# Patient Record
Sex: FEMALE | Race: WHITE | NOT HISPANIC OR LATINO
[De-identification: names, ages, dates, MRNs, and addresses within clinical notes are randomized per-mention and may not be internally consistent; named-entity substitution may affect disease eponyms.]

---

## 2017-03-28 ENCOUNTER — LABORATORY RESULT (OUTPATIENT)
Age: 52
End: 2017-03-28

## 2017-03-28 ENCOUNTER — APPOINTMENT (OUTPATIENT)
Dept: RHEUMATOLOGY | Facility: CLINIC | Age: 52
End: 2017-03-28

## 2017-03-28 VITALS
BODY MASS INDEX: 39.31 KG/M2 | SYSTOLIC BLOOD PRESSURE: 130 MMHG | WEIGHT: 195 LBS | DIASTOLIC BLOOD PRESSURE: 72 MMHG | OXYGEN SATURATION: 98 % | HEIGHT: 59 IN | HEART RATE: 77 BPM | TEMPERATURE: 97.8 F

## 2017-03-28 DIAGNOSIS — M62.838 OTHER MUSCLE SPASM: ICD-10-CM

## 2017-03-28 RX ORDER — MULTIVITAMIN
TABLET ORAL
Refills: 0 | Status: ACTIVE | COMMUNITY

## 2017-03-28 RX ORDER — NAPROXEN 500 MG/1
500 TABLET, DELAYED RELEASE ORAL
Qty: 60 | Refills: 0 | Status: DISCONTINUED | COMMUNITY
Start: 2016-11-29 | End: 2017-03-28

## 2017-03-30 LAB
APPEARANCE: CLEAR
B2 GLYCOPROT1 IGA SERPL IA-ACNC: <5 SAU
BILIRUBIN URINE: NEGATIVE
BLOOD URINE: NEGATIVE
C3 SERPL-MCNC: 153 MG/DL
C4 SERPL-MCNC: 38 MG/DL
CARDIOLIPIN AB SER IA-ACNC: POSITIVE
COLOR: YELLOW
CONFIRM: 25.3 SEC
DRVVT IMM 1:2 NP PPP: NORMAL
DRVVT SCREEN TO CONFIRM RATIO: 1.09 RATIO
ERYTHROCYTE [SEDIMENTATION RATE] IN BLOOD BY WESTERGREN METHOD: 17 MM/HR
GLUCOSE QUALITATIVE U: NORMAL MG/DL
KETONES URINE: NEGATIVE
LEUKOCYTE ESTERASE URINE: NEGATIVE
NITRITE URINE: NEGATIVE
PH URINE: 6
PROTEIN URINE: NEGATIVE MG/DL
SCREEN DRVVT: 31 SEC
SPECIFIC GRAVITY URINE: 1.02
UROBILINOGEN URINE: NORMAL MG/DL

## 2017-04-04 LAB
DSDNA AB SER-ACNC: 35 IU/ML
PS IGA SER QL: <20 U/ML
PS IGG SER QL: <10 U/ML
PS IGM SER QL: 27 U/ML

## 2017-05-01 ENCOUNTER — RESULT REVIEW (OUTPATIENT)
Age: 52
End: 2017-05-01

## 2017-08-22 ENCOUNTER — APPOINTMENT (OUTPATIENT)
Dept: RHEUMATOLOGY | Facility: CLINIC | Age: 52
End: 2017-08-22

## 2018-02-28 ENCOUNTER — OUTPATIENT (OUTPATIENT)
Dept: OUTPATIENT SERVICES | Facility: HOSPITAL | Age: 53
LOS: 1 days | End: 2018-02-28
Payer: COMMERCIAL

## 2018-02-28 ENCOUNTER — APPOINTMENT (OUTPATIENT)
Dept: ULTRASOUND IMAGING | Facility: CLINIC | Age: 53
End: 2018-02-28
Payer: COMMERCIAL

## 2018-02-28 DIAGNOSIS — Z00.8 ENCOUNTER FOR OTHER GENERAL EXAMINATION: ICD-10-CM

## 2018-02-28 PROCEDURE — 76536 US EXAM OF HEAD AND NECK: CPT | Mod: 26

## 2018-02-28 PROCEDURE — 76536 US EXAM OF HEAD AND NECK: CPT

## 2018-03-15 ENCOUNTER — APPOINTMENT (OUTPATIENT)
Dept: RHEUMATOLOGY | Facility: CLINIC | Age: 53
End: 2018-03-15
Payer: COMMERCIAL

## 2018-03-15 VITALS
HEART RATE: 109 BPM | DIASTOLIC BLOOD PRESSURE: 86 MMHG | WEIGHT: 197 LBS | OXYGEN SATURATION: 97 % | SYSTOLIC BLOOD PRESSURE: 130 MMHG | HEIGHT: 71 IN | BODY MASS INDEX: 27.58 KG/M2

## 2018-03-15 PROCEDURE — 99214 OFFICE O/P EST MOD 30 MIN: CPT

## 2018-03-15 RX ORDER — TIZANIDINE 4 MG/1
4 TABLET ORAL
Qty: 90 | Refills: 2 | Status: DISCONTINUED | COMMUNITY
Start: 2017-03-28 | End: 2018-03-15

## 2018-03-15 RX ORDER — OMEPRAZOLE 40 MG/1
40 CAPSULE, DELAYED RELEASE ORAL
Qty: 30 | Refills: 0 | Status: DISCONTINUED | COMMUNITY
Start: 2016-10-10 | End: 2018-03-15

## 2018-03-26 ENCOUNTER — LABORATORY RESULT (OUTPATIENT)
Age: 53
End: 2018-03-26

## 2018-03-27 LAB
ALBUMIN SERPL ELPH-MCNC: 4.3 G/DL
ALP BLD-CCNC: 81 U/L
ALT SERPL-CCNC: 22 U/L
ANION GAP SERPL CALC-SCNC: 17 MMOL/L
APPEARANCE: CLEAR
AST SERPL-CCNC: 21 U/L
B2 GLYCOPROT1 IGA SERPL IA-ACNC: <5 SAU
BASOPHILS # BLD AUTO: 0.01 K/UL
BASOPHILS NFR BLD AUTO: 0.2 %
BILIRUB SERPL-MCNC: 0.2 MG/DL
BILIRUBIN URINE: NEGATIVE
BLOOD URINE: NEGATIVE
BUN SERPL-MCNC: 15 MG/DL
C3 SERPL-MCNC: 151 MG/DL
C4 SERPL-MCNC: 37 MG/DL
CALCIUM SERPL-MCNC: 9.5 MG/DL
CARDIOLIPIN AB SER IA-ACNC: POSITIVE
CHLORIDE SERPL-SCNC: 101 MMOL/L
CO2 SERPL-SCNC: 24 MMOL/L
COLOR: YELLOW
CONFIRM: 25.2 SEC
CREAT SERPL-MCNC: 0.92 MG/DL
CRP SERPL-MCNC: 0.6 MG/DL
DRVVT IMM 1:2 NP PPP: NORMAL
DRVVT SCREEN TO CONFIRM RATIO: 1.21 RATIO
DSDNA AB SER-ACNC: 34 IU/ML
ENA RNP AB SER IA-ACNC: <0.2 AL
ENA SM AB SER IA-ACNC: <0.2 AL
ENA SS-A AB SER IA-ACNC: <0.2 AL
ENA SS-B AB SER IA-ACNC: <0.2 AL
EOSINOPHIL # BLD AUTO: 0.09 K/UL
EOSINOPHIL NFR BLD AUTO: 1.6 %
ERYTHROCYTE [SEDIMENTATION RATE] IN BLOOD BY WESTERGREN METHOD: 27 MM/HR
GLUCOSE QUALITATIVE U: NEGATIVE MG/DL
GLUCOSE SERPL-MCNC: 84 MG/DL
HCT VFR BLD CALC: 37.8 %
HGB BLD-MCNC: 12.7 G/DL
IMM GRANULOCYTES NFR BLD AUTO: 0.2 %
KETONES URINE: NEGATIVE
LEUKOCYTE ESTERASE URINE: NEGATIVE
LYMPHOCYTES # BLD AUTO: 1.79 K/UL
LYMPHOCYTES NFR BLD AUTO: 32.4 %
MAN DIFF?: NORMAL
MCHC RBC-ENTMCNC: 27 PG
MCHC RBC-ENTMCNC: 33.6 GM/DL
MCV RBC AUTO: 80.4 FL
MONOCYTES # BLD AUTO: 0.34 K/UL
MONOCYTES NFR BLD AUTO: 6.2 %
NEUTROPHILS # BLD AUTO: 3.28 K/UL
NEUTROPHILS NFR BLD AUTO: 59.4 %
NITRITE URINE: NEGATIVE
PH URINE: 5
PLATELET # BLD AUTO: 293 K/UL
POTASSIUM SERPL-SCNC: 4.2 MMOL/L
PROT SERPL-MCNC: 7.6 G/DL
PROTEIN URINE: NEGATIVE MG/DL
RBC # BLD: 4.7 M/UL
RBC # FLD: 14.4 %
RHEUMATOID FACT SER QL: <7 IU/ML
SCREEN DRVVT: 34.3 SEC
SODIUM SERPL-SCNC: 142 MMOL/L
SPECIFIC GRAVITY URINE: 1.03
TSH SERPL-ACNC: 1.83 UIU/ML
UROBILINOGEN URINE: NEGATIVE MG/DL
WBC # FLD AUTO: 5.52 K/UL

## 2018-03-28 LAB — ANA SER IF-ACNC: NEGATIVE

## 2018-03-30 RX ORDER — CELECOXIB 200 MG/1
200 CAPSULE ORAL DAILY
Qty: 30 | Refills: 1 | Status: DISCONTINUED | COMMUNITY
Start: 2018-03-30 | End: 2018-03-30

## 2018-05-03 ENCOUNTER — RESULT REVIEW (OUTPATIENT)
Age: 53
End: 2018-05-03

## 2018-08-21 ENCOUNTER — APPOINTMENT (OUTPATIENT)
Dept: RHEUMATOLOGY | Facility: CLINIC | Age: 53
End: 2018-08-21

## 2019-04-02 ENCOUNTER — APPOINTMENT (OUTPATIENT)
Dept: RHEUMATOLOGY | Facility: CLINIC | Age: 54
End: 2019-04-02
Payer: MEDICAID

## 2019-04-02 VITALS
SYSTOLIC BLOOD PRESSURE: 110 MMHG | HEIGHT: 59 IN | OXYGEN SATURATION: 99 % | BODY MASS INDEX: 39.31 KG/M2 | HEART RATE: 75 BPM | DIASTOLIC BLOOD PRESSURE: 78 MMHG | WEIGHT: 195 LBS

## 2019-04-02 PROCEDURE — 99213 OFFICE O/P EST LOW 20 MIN: CPT

## 2019-04-02 NOTE — HISTORY OF PRESENT ILLNESS
[FreeTextEntry1] : 20\par Works as cris.. \par - last seen 1 yr ago... since then...\par - routinely taking aleve 440 mg and occas at HS uses APAP w/ Benadryl... sleep at this point still not good\par - sleep still disrupted... oxybutin effective at keeping sleep but notes exacerbation of asthma, productive sputum... and meds ineffective.  Trouble staying asleep, probable  GARRET  was supposed to have nasal surgery to address obstructed nare, but lost insurance... hasn't had sleep\par - voltaren gel not effective... \par - GERD controlled w/ once daily low dose PPI, rare mild vomiting w/ severe stress...\par - mild intermittent paresthesias hands b/l ? unclear if full hand... c/w sleep at HS \par \par ROS:  \par - no migraines, visual changes\par - no mucositis \par - cardiopulmonary sx\par - no constipation but diarrhea intermittently feels food r/t \par - no swelling in joint/s joint mild diffuse arthralgias controlled w/ aleve as noted.. 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  Intermittent episodes of radiculopathy only on L side (steroids required in past) - pain always worse after use... end of day\par Heel pain:  b/l L worse than R, xrays suggest osteoporosis.  \par \par 4) GERD/ hiatal hernia cannot miss PPI.  Ranitidine not helpful, tums (hasn't tried carafate) GI, Dr. 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  Associated w/ stiffness most significant inflammatory back sx L > R  30-60 mins but all other joints also stiff.  Usually on consistent for past year- no overt swelling in any joints-  Using Aleve 2-4 tabs daily w/ nearly complete relief. \par Hypersensitivity to touch\par Migraines in past,  3 elective ab, 1 1st trimester, no DVT, PE, MI, CVA\par Recent Cardiac stress test for long term H2/ PPI and obesity  \par NO personal or family hx of psoriais, IBD, inflammatory eye dz, AS \par Long hx LBP- localized so severe can't walk, at times can't tolerate more than 15-30 mins.  Intermittent episodes of radiculopathy only on L side (steroids required in past) \par Heel pain:  b/l L worse than R, xrays suggest osteoporosis.  MRI pending \par w/ significant hot flashes for past year- periods irreg- LMP 6 mnths ago\par Significant issue w/ dyscognition\par Lives with GERD/ hiatal hernia cannot miss PPI.  Ranitidine not helpful, tums (hasn't tried carafate) GI, Dr. Lopez otherwise no significant GI issues-\par Bladder:  IC overactive, incontinence doing well on oxyybutin- does have severe dry mouth with this, and exacerbates asthma.\par ROS:  no significant fatigue, no fevers, lymphandenopathy, dry mouth drug related, no dry eyes; serositis, thinning in male pattern, mild intermittent cough r/t asthma.  no renal/ liver dysfunction\par + Lyme and HPV in past   \par \par Asthma:  fairly well controlled on daily H2- allegra D daily- nothing else, poor tolerance in past.  Rare need for inhalers.  \par NOTE:  doesn't tolerate steroids well- everything feels miserable with change in mental status- fogginess and altered personality, migraines in past  \par \par Bone Health \par FH:  + maternal aunts w/ RA \par \par

## 2019-04-02 NOTE — DATA REVIEWED
[FreeTextEntry1] : Labs \par 11/16 B2G IGA/ IGG nl, phosphatidyly neg, ACL neg, LAC neg, EV, TPO/ TG, dsDNA neg, nl C3/4\par vit D 25, TSH/ ESR, CRP, PTT \par 7/16 B2G IgM > 150, ACL 1gM > 15 w/ nl LAC Positive and all other serologies neg w/ nl C3/4 and neg TPO/TG \par 3/16:  Uric acid 4.8, CMP, CBC nl \par \par Imaging: \par 2/18 US Thyroid/ neck neg for cervical / supraclavicular abnormality. \par Xray SI joints 8/16 nl \par Dexa 7/22/16 excellent all + values \par

## 2019-04-02 NOTE — REVIEW OF SYSTEMS
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 no infections, well healed

## 2019-04-02 NOTE — ASSESSMENT
[FreeTextEntry1] : 54 yo perimenopausal wf w/ c/o intermittent  joint pain- "everywhere" \par \par 1) POsitive EV/ Chronic pain (FM):  1:160 H. w/ + LAC, + dsDNA low 35, nl C3/4 and high titer B2G IgM > 150 \par APS:   updated labs ordered... still doesn't meet criteria \par 2016  + LAC w/ B2G IGM > 150, ACL IGM + and PTT slightly elevated w/ neg PTS\par Dec  2016  - LAC w/ neg ACL, nl PTT and neg PTS. \par Mar  2016  - LAC, + ACL IGM 24, B2G IGM > 150, neg PTS, nl PTT\par  severe bleeding throughout, 1 miscarriage 1st trimester and 3 elective ab...\par OTC NSAIDS w/ nearly complete relief. 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 unchanged\par but NO personal or family hx of psoriais, IBD, inflammatory eye dz, AS, does have + FH RA \par NOTE:  doesn't tolerate steroids well- everything feels miserable with change in mental status- fogginess and altered personality, migraines in past\par \par 3) Long hx LBP- localized so severe can't walk, at times can't tolerate more than 15-30 mins.  Intermittent episodes of radiculopathy only on L side (steroids required in past) - pain always worse after use... end of day. Doing well today w/ use of NSAIDs... would like long acting Naproxen... \par Heel pain:  b/l L worse than R, xrays suggest osteoporosis.  \par \par 4) GERD/ hiatal hernia cannot miss PPI.  Ranitidine not helpful, tums (hasn't tried carafate) GI, Dr. Lopez otherwise no significant GI issues-\par \par 5) Perimenopausal:  Perimenopausal sx severe at times w/ Dexa  nl \par \par 6) Obesity:  BMI 38- now 39 needs to be addressed, contributes to "unwell" state and potentiates inflammatory state.\par \par Plan:\par - continue current tx, no change except consider topical NSAIds to minimize Gi SE oral Naproxen w/ hx GERd\par - repeat labs now, check with me about results at this point you do NOT meet criteria for Antiphospholipid Syndrome and you do NOT have Systemic Lupus\par - continue to monitor for evidence of SLE/ or other CTD.  No clear evidence at this time, but high risk given serologies as noted.  \par - rto 3-6  months \par \par

## 2019-04-02 NOTE — PHYSICAL EXAM
[General Appearance - Alert] : alert [General Appearance - In No Acute Distress] : in no acute distress [Respiration, Rhythm And Depth] : normal respiratory rhythm and effort [Exaggerated Use Of Accessory Muscles For Inspiration] : no accessory muscle use [Auscultation Breath Sounds / Voice Sounds] : lungs were clear to auscultation bilaterally [Heart Rate And Rhythm] : heart rate was normal and rhythm regular [Heart Sounds] : normal S1 and S2 [Heart Sounds Gallop] : no gallops [Murmurs] : no murmurs [Heart Sounds Pericardial Friction Rub] : no pericardial rub [Cervical Lymph Nodes Enlarged Posterior Bilaterally] : posterior cervical [Cervical Lymph Nodes Enlarged Anterior Bilaterally] : anterior cervical [Supraclavicular Lymph Nodes Enlarged Bilaterally] : supraclavicular [No CVA Tenderness] : no ~M costovertebral angle tenderness [No Spinal Tenderness] : no spinal tenderness [Abnormal Walk] : normal gait [Nail Clubbing] : no clubbing  or cyanosis of the fingernails [Musculoskeletal - Swelling] : no joint swelling seen [Motor Tone] : muscle strength and tone were normal [Skin Color & Pigmentation] : normal skin color and pigmentation [Skin Turgor] : normal skin turgor [] : no rash [Oriented To Time, Place, And Person] : oriented to person, place, and time [Impaired Insight] : insight and judgment were intact [Affect] : the affect was normal [Arterial Pulses Carotid] : carotid pulses were normal with no bruits [FreeTextEntry1] : no psoriasis

## 2019-04-08 ENCOUNTER — OTHER (OUTPATIENT)
Age: 54
End: 2019-04-08

## 2019-04-16 ENCOUNTER — LABORATORY RESULT (OUTPATIENT)
Age: 54
End: 2019-04-16

## 2019-04-17 ENCOUNTER — OTHER (OUTPATIENT)
Age: 54
End: 2019-04-17

## 2019-04-17 LAB
ALBUMIN SERPL ELPH-MCNC: 4.6 G/DL
ALP BLD-CCNC: 91 U/L
ALT SERPL-CCNC: 25 U/L
ANION GAP SERPL CALC-SCNC: 12 MMOL/L
APPEARANCE: CLEAR
AST SERPL-CCNC: 21 U/L
BASOPHILS # BLD AUTO: 0.02 K/UL
BASOPHILS NFR BLD AUTO: 0.4 %
BILIRUB SERPL-MCNC: 0.2 MG/DL
BILIRUBIN URINE: NEGATIVE
BLOOD URINE: NEGATIVE
BUN SERPL-MCNC: 10 MG/DL
C3 SERPL-MCNC: 160 MG/DL
C4 SERPL-MCNC: 34 MG/DL
CALCIUM SERPL-MCNC: 10.2 MG/DL
CARDIOLIPIN AB SER IA-ACNC: POSITIVE
CHLORIDE SERPL-SCNC: 101 MMOL/L
CK SERPL-CCNC: 105 U/L
CO2 SERPL-SCNC: 25 MMOL/L
COLOR: NORMAL
CONFIRM: 24.5 SEC
CREAT SERPL-MCNC: 0.64 MG/DL
CREAT SPEC-SCNC: 41 MG/DL
CREAT/PROT UR: NORMAL RATIO
CRP SERPL-MCNC: 0.55 MG/DL
DRVVT IMM 1:2 NP PPP: NORMAL
DRVVT SCREEN TO CONFIRM RATIO: 1.09 RATIO
DSDNA AB SER-ACNC: 59 IU/ML
ENA RNP AB SER IA-ACNC: <0.2 AL
ENA SM AB SER IA-ACNC: <0.2 AL
ENA SS-A AB SER IA-ACNC: <0.2 AL
ENA SS-B AB SER IA-ACNC: <0.2 AL
EOSINOPHIL # BLD AUTO: 0.09 K/UL
EOSINOPHIL NFR BLD AUTO: 1.8 %
ERYTHROCYTE [SEDIMENTATION RATE] IN BLOOD BY WESTERGREN METHOD: 29 MM/HR
GLUCOSE QUALITATIVE U: NEGATIVE
GLUCOSE SERPL-MCNC: 93 MG/DL
HCT VFR BLD CALC: 39.7 %
HGB BLD-MCNC: 12.8 G/DL
IMM GRANULOCYTES NFR BLD AUTO: 0.2 %
KETONES URINE: NEGATIVE
LEUKOCYTE ESTERASE URINE: NEGATIVE
LYMPHOCYTES # BLD AUTO: 1.82 K/UL
LYMPHOCYTES NFR BLD AUTO: 35.5 %
MAN DIFF?: NORMAL
MCHC RBC-ENTMCNC: 26.6 PG
MCHC RBC-ENTMCNC: 32.2 GM/DL
MCV RBC AUTO: 82.5 FL
MONOCYTES # BLD AUTO: 0.4 K/UL
MONOCYTES NFR BLD AUTO: 7.8 %
NEUTROPHILS # BLD AUTO: 2.79 K/UL
NEUTROPHILS NFR BLD AUTO: 54.3 %
NITRITE URINE: NEGATIVE
PH URINE: 6.5
PLATELET # BLD AUTO: 320 K/UL
POTASSIUM SERPL-SCNC: 4.4 MMOL/L
PROT SERPL-MCNC: 7.1 G/DL
PROT UR-MCNC: <4 MG/DL
PROTEIN URINE: NEGATIVE
RBC # BLD: 4.81 M/UL
RBC # FLD: 13.8 %
SCREEN DRVVT: 32.1 SEC
SILICA CLOTTING TIME INTERPRETATION: ABNORMAL
SILICA CLOTTING TIME S/C: 1.17 RATIO
SODIUM SERPL-SCNC: 138 MMOL/L
SPECIFIC GRAVITY URINE: 1.01
TSH SERPL-ACNC: 1.74 UIU/ML
UROBILINOGEN URINE: NORMAL
WBC # FLD AUTO: 5.13 K/UL

## 2019-04-17 RX ORDER — NAPROXEN SODIUM 500 MG/1
500 TABLET, FILM COATED, EXTENDED RELEASE ORAL DAILY
Qty: 180 | Refills: 1 | Status: DISCONTINUED | COMMUNITY
Start: 2019-04-02 | End: 2019-04-17

## 2019-04-18 RX ORDER — MELOXICAM 7.5 MG/1
7.5 TABLET ORAL
Qty: 60 | Refills: 1 | Status: DISCONTINUED | COMMUNITY
Start: 2019-04-17 | End: 2019-04-18

## 2019-05-05 LAB
25(OH)D3 SERPL-MCNC: 29.7 NG/ML
A PHAGOCYTOPH IGG TITR SER IF: NORMAL TITER
ANA SER IF-ACNC: NEGATIVE
B BURGDOR AB SER QL IA: NEGATIVE
B MICROTI IGG TITR SER: NORMAL TITER
B2 GLYCOPROT1 IGA SERPL IA-ACNC: <5 SAU
E CHAFFEENSIS IGG TITR SER IF: ABNORMAL TITER

## 2019-05-16 ENCOUNTER — OTHER (OUTPATIENT)
Age: 54
End: 2019-05-16

## 2019-10-10 ENCOUNTER — APPOINTMENT (OUTPATIENT)
Dept: RHEUMATOLOGY | Facility: CLINIC | Age: 54
End: 2019-10-10
Payer: MEDICAID

## 2019-10-10 VITALS
SYSTOLIC BLOOD PRESSURE: 140 MMHG | HEART RATE: 76 BPM | HEIGHT: 59 IN | BODY MASS INDEX: 41.53 KG/M2 | WEIGHT: 206 LBS | DIASTOLIC BLOOD PRESSURE: 72 MMHG | OXYGEN SATURATION: 98 %

## 2019-10-10 DIAGNOSIS — E78.5 HYPERLIPIDEMIA, UNSPECIFIED: ICD-10-CM

## 2019-10-10 DIAGNOSIS — Z86.19 PERSONAL HISTORY OF OTHER INFECTIOUS AND PARASITIC DISEASES: ICD-10-CM

## 2019-10-10 DIAGNOSIS — R73.03 PREDIABETES.: ICD-10-CM

## 2019-10-10 DIAGNOSIS — I10 ESSENTIAL (PRIMARY) HYPERTENSION: ICD-10-CM

## 2019-10-10 PROCEDURE — 99214 OFFICE O/P EST MOD 30 MIN: CPT

## 2019-10-10 RX ORDER — DOXYCYCLINE HYCLATE 100 MG/1
100 CAPSULE ORAL
Qty: 14 | Refills: 0 | Status: DISCONTINUED | COMMUNITY
Start: 2019-04-25 | End: 2019-10-10

## 2019-10-10 NOTE — ASSESSMENT
[FreeTextEntry1] : 53 yo perimenopausal obese wf w/ HLD, mild HTN, recent shingles/ and erhlichiosis (both tx), + LAC w/ intermittently low level ACL/ B2G... with hx 1 miscarriage; + EV but neg subserologies  c/o intermittent  joint pain- "everywhere" \par \par 1) POsitive EV:  1:160 H. w/ + LAC, + dsDNA low 35, nl C3/4 and high titer B2G IgM > 150 \par APS:   updated labs ordered... still doesn't meet criteria \par 2016  + LAC w/ B2G IGM > 150, ACL IGM + and PTT slightly elevated w/ neg PTS\par Dec  2016  - LAC w/ neg ACL, nl PTT and neg PTS. \par Mar  2016  - LAC, + ACL IGM 24, B2G IGM > 150, neg PTS, nl PTT... \par 2019  - LAC (DRVVT) / + silica w/ low + ACL IgM nl PTT \par  severe bleeding throughout, 1 miscarriage 1st trimester and 3 elective ab...no other cytopenias/ bleeding/ clotting dyscrasias... \par \par 2) Chronic pain, mild FM: diffuse for many ys managed well with OTC / low dose NsAids but poor, NRS.. w/ hx snoring, daytime somnolence... \par - Sleep:  sleep latency up to 2-3 hs and then only 4-6 hs disrupted 2-4 x wakes.. despite use of oxybutin.  Gabapentin short term in past + response. Hasn't tried melatonin, mg or other natural therapies. Sleep study pending. \par - Migraines in past but nothing to suggest APS otherwise....\par - CP:  nothing recently mild recent cardiac stress test for long term mild - negative,  H2/ PPI w/ + response  \par - Dyscognition:  Significant issue worse when feeling poorly.. and not sleeping \par - Bladder:  IC overactive, incontinence doing well on oxyybutin- does have severe dry mouth with this\par OTC NSAIDS w/ nearly complete relief. \par - paresthesias/ post herpetic neuralgia L sided T 2 dermatome:  migratory, mild.. 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  Intermittent episodes of radiculopathy only on L side (steroids required in past) - pain always worse after use... end of day. Doing well today... would like long acting Naproxen... but issues with HTN.. needs to minimize use \par Heel pain:  b/l L worse than R, xrays suggest osteoporosis.  \par \par 5) GERD/ hiatal hernia cannot miss PPI.  Ranitidine not helpful, tums (hasn't tried carafate) GI, Dr. Lopez otherwise no significant GI issues-\par \par 6) Perimenopausal:  Perimenopausal sx severe at times w/ Dexa  nl \par \par 7) Obesity:  BMI 38- now 39- 41 with nearly 10 lb wt gain over past several months.  Now with\par - HTN:  newly dx... advised to minimize use of NSAIds and salt.. try APAP for pain.. see below\par - HLD:  noted mild ... will need to monitor\par - T2DM:  HbA1c 6.3 ... needs careful monitoring here too\par \par Plan:\par - use tylenol instead of aleve/ advil or naproxen.. reserve this 375mg to no more than once daily..  only when the tylenol doesn't ... 2400 mg max - 2 tylenol arthritis daily... . \par \par - try to minimize sodium intake (salty food.. don't add salt to food)... \par \par - take gabapentin once nightly.. if needed you can increase to 2 tabs at night if you don't feel to groggy, if you do that should go away within a week or so.  Gabapentin:  helps to give restorative sleep once on correct dose; decreases anxiety, decreases pain (all types of pain) \par \par - still need to work on wt loss.. this will help decrease snoring, sleep apnea, joint pain and back pain.  \par \par - still need to f/u w/ pulmonary regarding sleep study \par \par - rto 3-6  months \par \par

## 2019-10-10 NOTE — DATA REVIEWED
[FreeTextEntry1] : Labs \par 5/19 + dsDNA 59 w/ neg EV, SEBASTIAN, SSA/ B, + LAC w/ ACL 1gM 19, \par 11/16 B2G IGA/ IGG nl, phosphatidyly neg, ACL neg, LAC neg, EV, TPO/ TG, dsDNA neg, nl C3/4\par vit D 25, TSH/ ESR, CRP, PTT \par 7/16 B2G IgM > 150, ACL 1gM > 15 w/ nl LAC Positive and all other serologies neg w/ nl C3/4 and neg TPO/TG \par 3/16:  Uric acid 4.8, CMP, CBC nl \par \par Imaging: \par 2/18 US Thyroid/ neck neg for cervical / supraclavicular abnormality. \par Xray SI joints 8/16 nl \par Dexa 7/22/16 excellent all + values \par

## 2019-10-10 NOTE — PHYSICAL EXAM
[General Appearance - Alert] : alert [General Appearance - In No Acute Distress] : in no acute distress [Exaggerated Use Of Accessory Muscles For Inspiration] : no accessory muscle use [Respiration, Rhythm And Depth] : normal respiratory rhythm and effort [Auscultation Breath Sounds / Voice Sounds] : lungs were clear to auscultation bilaterally [Heart Sounds] : normal S1 and S2 [Heart Rate And Rhythm] : heart rate was normal and rhythm regular [Heart Sounds Gallop] : no gallops [Cervical Lymph Nodes Enlarged Posterior Bilaterally] : posterior cervical [Cervical Lymph Nodes Enlarged Anterior Bilaterally] : anterior cervical [Supraclavicular Lymph Nodes Enlarged Bilaterally] : supraclavicular [No Spinal Tenderness] : no spinal tenderness [No CVA Tenderness] : no ~M costovertebral angle tenderness [Abnormal Walk] : normal gait [Nail Clubbing] : no clubbing  or cyanosis of the fingernails [Musculoskeletal - Swelling] : no joint swelling seen [Motor Tone] : muscle strength and tone were normal [Skin Color & Pigmentation] : normal skin color and pigmentation [Skin Turgor] : normal skin turgor [] : no rash [Oriented To Time, Place, And Person] : oriented to person, place, and time [Impaired Insight] : insight and judgment were intact [Affect] : the affect was normal [General Appearance - Well-Appearing] : healthy appearing [General Appearance - Well Developed] : well developed [Murmurs] : no murmurs [Heart Sounds Pericardial Friction Rub] : no pericardial rub [Edema] : there was no peripheral edema [No Focal Deficits] : no focal deficits [FreeTextEntry1] : fast, forced speech

## 2019-10-10 NOTE — REVIEW OF SYSTEMS
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 no infections, well healed  [de-identified] : T 2 L sided post herpetic neuralgia

## 2019-10-10 NOTE — HISTORY OF PRESENT ILLNESS
[FreeTextEntry1] : 10/10/19\par - completed doxycyline for erhlichiosis tolerated but developed shingles nearly immediately after \par - overall feeling poorly, fatigue with NRS.. \par - working w/ Dr. Torres.. still feels NOT sleeping.. \par goes to bed 2-3 am then up between 7-9 am.. currently 2-4 cups > 30-40 oz daily\par Home sleep study done... pending results\par - denies recent migraines, cp/ sob/ pickering, hx of TIA/ CVA/ MI/ or DVT- PE.. no bruising or cytopenias \par Labs 10/19 nl CBC, CMP and mildly elevated Lipids/ HbA1c 6.3 \par - DM remains borderline at 6.3 HbA1c\par - Newly dx HTN and mild progressive wt gain- 6-10 lbs in past year..  but still taking 500 mg Naproxen.  Didn't tolerate Toviaz... and concerned about diuretic. Doesn't feel possibly hold NSAID 2/2 pain.. \par - sleep: too energized to fall asleep.. at times into 2-3 am.. then gets 5-6 hs but wakes several times.  Also c/o hot flashes... \par Trial APAP PM, flexeril, tizanidine both made it worse.  Only thing effective in past.. gabapentin + in past w/ excellent response.. \par - shingles severe  .. has since started Shingrix \par - Works as [employer].. makes own hours\par - GERD controlled w/ once daily low dose PPI, rare mild vomiting w/ severe stress...\par - mild intermittent paresthesias hands and at times into L leg.. along with post herpetic neuralgia noted along T2 dermatome.. gabapentin 300 mg highly effective at controlling pain and actually slept for 1st time in yr.. \par  \par \par 1) POsitive EV/ Chronic pain (FM):  1:160 H. 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  Intermittent episodes of radiculopathy only on L side (steroids required in past) - pain always worse after use... end of day\par Heel pain:  b/l L worse than R, xrays suggest osteoporosis.  \par \par 4) GERD/ hiatal hernia cannot miss PPI.  Ranitidine not helpful, tums (hasn't tried carafate) GI, Dr. 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  Associated w/ stiffness most significant inflammatory back sx L > R  30-60 mins but all other joints also stiff.  Usually on consistent for past year- no overt swelling in any joints-  Using Aleve 2-4 tabs daily w/ nearly complete relief. \par Hypersensitivity to touch\par Migraines in past,  3 elective ab, 1 1st trimester, no DVT, PE, MI, CVA\par Recent Cardiac stress test for long term H2/ PPI and obesity 2016 \par NO personal or family hx of psoriais, IBD, inflammatory eye dz, AS \par Long hx LBP- localized so severe can't walk, at times can't tolerate more than 15-30 mins.  Intermittent episodes of radiculopathy only on L side (steroids required in past) \par Heel pain:  b/l L worse than R, xrays suggest osteoporosis.  MRI pending \par w/ significant hot flashes for past year- periods irreg- LMP 6 mnths ago\par Significant issue w/ dyscognition\par Lives with GERD/ hiatal hernia cannot miss PPI.  Ranitidine not helpful, tums (hasn't tried carafate) GI, Dr. Lopez otherwise no significant GI issues-\par Bladder:  IC overactive, incontinence doing well on oxyybutin- does have severe dry mouth with this, and exacerbates asthma.\par ROS:  no significant fatigue, no fevers, lymphandenopathy, dry mouth drug related, no dry eyes; serositis, thinning in male pattern, mild intermittent cough r/t asthma.  no renal/ liver dysfunction\par + Lyme and HPV in past   \par \par Asthma:  fairly well controlled on daily H2- allegra D daily- nothing else, poor tolerance in past.  Rare need for inhalers.  \par NOTE:  doesn't tolerate steroids well- everything feels miserable with change in mental status- fogginess and altered personality, migraines in past  \par \par Bone Health \par FH:  + maternal aunts w/ RA \par \par

## 2019-11-01 ENCOUNTER — OUTPATIENT (OUTPATIENT)
Dept: OUTPATIENT SERVICES | Facility: HOSPITAL | Age: 54
LOS: 1 days | End: 2019-11-01
Payer: MEDICAID

## 2019-11-06 ENCOUNTER — APPOINTMENT (OUTPATIENT)
Dept: RADIOLOGY | Facility: CLINIC | Age: 54
End: 2019-11-06
Payer: MEDICAID

## 2019-11-06 ENCOUNTER — OUTPATIENT (OUTPATIENT)
Dept: OUTPATIENT SERVICES | Facility: HOSPITAL | Age: 54
LOS: 1 days | End: 2019-11-06
Payer: MEDICAID

## 2019-11-06 DIAGNOSIS — Z71.89 OTHER SPECIFIED COUNSELING: ICD-10-CM

## 2019-11-06 DIAGNOSIS — Z00.8 ENCOUNTER FOR OTHER GENERAL EXAMINATION: ICD-10-CM

## 2019-11-06 PROCEDURE — 73562 X-RAY EXAM OF KNEE 3: CPT

## 2019-11-06 PROCEDURE — 73562 X-RAY EXAM OF KNEE 3: CPT | Mod: 26,LT

## 2019-11-12 ENCOUNTER — APPOINTMENT (OUTPATIENT)
Dept: ORTHOPEDIC SURGERY | Facility: CLINIC | Age: 54
End: 2019-11-12
Payer: MEDICAID

## 2019-11-12 VITALS
HEART RATE: 97 BPM | BODY MASS INDEX: 41.53 KG/M2 | SYSTOLIC BLOOD PRESSURE: 137 MMHG | DIASTOLIC BLOOD PRESSURE: 91 MMHG | HEIGHT: 59 IN | WEIGHT: 206 LBS | TEMPERATURE: 98.2 F

## 2019-11-12 DIAGNOSIS — Z78.9 OTHER SPECIFIED HEALTH STATUS: ICD-10-CM

## 2019-11-12 DIAGNOSIS — Z87.39 PERSONAL HISTORY OF OTHER DISEASES OF THE MUSCULOSKELETAL SYSTEM AND CONNECTIVE TISSUE: ICD-10-CM

## 2019-11-12 DIAGNOSIS — Z86.39 PERSONAL HISTORY OF OTHER ENDOCRINE, NUTRITIONAL AND METABOLIC DISEASE: ICD-10-CM

## 2019-11-12 DIAGNOSIS — Z87.09 PERSONAL HISTORY OF OTHER DISEASES OF THE RESPIRATORY SYSTEM: ICD-10-CM

## 2019-11-12 PROCEDURE — 99203 OFFICE O/P NEW LOW 30 MIN: CPT | Mod: 25

## 2019-11-12 PROCEDURE — 20610 DRAIN/INJ JOINT/BURSA W/O US: CPT | Mod: LT

## 2019-11-13 NOTE — REVIEW OF SYSTEMS
[Negative] : Heme/Lymph [FreeTextEntry9] : Joint pain; joint stiffness; joint swelling  [FreeTextEntry2] : Recent weight gain [FreeTextEntry5] : Lower extremity edema  [FreeTextEntry8] : Urinary frequency; urinary urgency; incontinence  [de-identified] : Headache  [de-identified] : Sleep disturbances [de-identified] : Muscle weakness; hot flashes

## 2019-11-13 NOTE — HISTORY OF PRESENT ILLNESS
[de-identified] : The patient comes in today with complaints of left knee pain, mostly on the medial side.  The patient states that it has been going on for a while.  It should be noted that the patient is morbidly obese and is complaining of all joint pains.  She states she does have a history of lupus.  The patient states the onset/injury occurred on 9/5/19.  This injury is not work related or due to an automobile accident.  The patient states the pain is localized.  The patient describes the pain as achy and then sharp when trying to straighten it.  The patient states standing makes the pain better while bending and trying to stand makes the pain worse.\par \par Pain level includes a current pain level of 7/10.\par \par Ailment Interference:  \par Daily Living:  10/10\par Normal Work:  10/10\par Sleep:  10/10\par Enjoyment of Life:  10/10\par Climbing Stairs:  10/10\par Sports:  0/10\par Extra-Curricular Activities:   10/10 [] : Yes [de-identified] : She notes her left leg is weak.   [de-identified] : Dr. Osei

## 2019-11-13 NOTE — DISCUSSION/SUMMARY
[de-identified] : The patient was given a cortisone injection for the left knee pain, as noted above.  She is advised to ice it.  She is advised to return back to the office in a period of two weeks to see how she is feeling to discuss further treatment.

## 2019-11-13 NOTE — PROCEDURE
[de-identified] : Consent: \par At this time, I have recommended an injection to the left knee.  The risks and benefits of the procedure were discussed with the patient in detail.  Upon verbal consent of the patient, we proceeded with the injection as noted below.  \par \par Procedure:  \par After a sterile prep, the patient underwent an injection of 9 cc of 1% Lidocaine without epinephrine and 1 cc of Kenalog into the left knee.  The patient tolerated the procedure well.  There were no complications.

## 2019-11-13 NOTE — ADDENDUM
[FreeTextEntry1] : This note was written by Sana Vazquez on 11/13/2019 acting as scribe for Daly Sage, FIONA/L, PA

## 2019-11-13 NOTE — PHYSICAL EXAM
[de-identified] : Right Knee: \par Knee: Range of Motion in Degrees	\par 	                  Claimant/Normal:\par 		\par Flexion Active            135                        135-degrees\par Flexion Passive	  135	                135-degrees	\par Extension Active	  0-5	                0-5-degrees	\par Extension Passive	  0-5	                0-5-degrees	\par \par No weakness to flexion/extension.  No evidence of instability in the AP plane or varus or valgus stress.  Negative  Lachman.  Negative pivot shift.  Negative anterior drawer test.  Negative posterior drawer test.  Negative Dagoberto.  Negative Apley grind.  No medial or lateral joint line tenderness.  No tenderness over the medial and lateral facet of the patella.  No patellofemoral crepitations.  No lateral tilting patella.  No patellar apprehension.  No crepitation in the medial and lateral femoral condyle.  No proximal or distal swelling, edema or tenderness.  No gross motor or sensory deficits.  No intra-articular swelling.  2+ DP and PT pulses. No varus or valgus malalignment.  Skin is intact.  No rashes, scars or lesions.  \par  \par Left Knee:  \par Knee: Range of Motion in Degrees	\par 	                  Claimant:	Normal:	\par Flexion Active	  135 	                135-degrees	\par Flexion Passive	  135	                135-degrees	\par Extension Active	  0-5	                0-5-degrees	\par Extension Passive	  0-5	                0-5-degrees	\par \par No weakness to flexion/extension.  No evidence of instability in the AP plane or varus or valgus stress.  Negative  Lachman.  Negative pivot shift.  Negative anterior drawer test.  Negative posterior drawer test.  Positive medial joint line tenderness.  Negative lateral joint line tenderness.  Positive Dagoberto.  Positive Apley grind.  No tenderness over the medial and lateral facet of the patella.  No patellofemoral crepitations.  No lateral tilting patella.  No patella apprehension.  No crepitation in the medial and lateral femoral condyle.  No proximal or distal swelling, edema or tenderness.  No gross motor or sensory deficits.  Mild intra-articular swelling.  2+ DP and PT pulses.  No varus or valgus malalignment.  Skin is intact.  No rashes, scars or lesions.   \par  [de-identified] : General Appearance:  Well-developed, well-nourished female in no acute distress. \par  [de-identified] : Ambulating with a slightly antalgic to antalgic gait.  Station:  Normal.  [de-identified] : Outside x-rays reveals no acute fractures or dislocations.

## 2019-11-14 DIAGNOSIS — Z76.89 PERSONS ENCOUNTERING HEALTH SERVICES IN OTHER SPECIFIED CIRCUMSTANCES: ICD-10-CM

## 2019-11-19 ENCOUNTER — APPOINTMENT (OUTPATIENT)
Dept: ORTHOPEDIC SURGERY | Facility: CLINIC | Age: 54
End: 2019-11-19
Payer: MEDICAID

## 2019-11-19 VITALS
HEIGHT: 59 IN | WEIGHT: 206 LBS | BODY MASS INDEX: 41.53 KG/M2 | TEMPERATURE: 98.1 F | HEART RATE: 76 BPM | DIASTOLIC BLOOD PRESSURE: 86 MMHG | SYSTOLIC BLOOD PRESSURE: 145 MMHG

## 2019-11-19 PROCEDURE — 99213 OFFICE O/P EST LOW 20 MIN: CPT

## 2019-11-22 NOTE — HISTORY OF PRESENT ILLNESS
[de-identified] : The patient comes in today for her left knee.  She states she was here a few weeks ago with left knee pain, for which we gave her an injection.  The patient states it took a lot of her pain away, but she is still having pain and difficulty doing her activities of daily living.  It should be noted that the patient is morbid obese and complaining about all her joints. She does have a history of lupus.  The patient states this injury happened on 09/05/19.  The patient describes it as achy and then sharp when trying to straighten it.\par

## 2019-11-22 NOTE — ADDENDUM
[FreeTextEntry1] : This note was written by Reese Figureoa on 11/22/2019, acting as a scribe for MARTIN ZAVALETA, FIONA/DAVID PA

## 2019-11-22 NOTE — DISCUSSION/SUMMARY
[de-identified] : At this time, due to left knee pain, the patient is going to get an MRI to rule out any pathology since she states the pain is excruciating daily.\par

## 2019-11-22 NOTE — PHYSICAL EXAM
[de-identified] : Left Knee: Range of Motion in Degrees	\par 	                  Claimant:	Normal:	\par Flexion Active	  135 	                135-degrees	\par Flexion Passive	  135	                135-degrees	\par Extension Active	  0-5	                0-5-degrees	\par Extension Passive	  0-5	                0-5-degrees	\par \par No weakness to flexion/extension.  No evidence of instability in the AP plane or varus or valgus stress.  Negative  Lachman.  Negative pivot shift.  Negative anterior drawer test.  Negative posterior drawer test.  Pain over the medial joint line.  Positive medial joint line tenderness.  Negative lateral joint line tenderness.  Positive Dagoberto.  Positive Apley grind.  No tenderness over the medial and lateral facet of the patella.  No patellofemoral crepitations.  No lateral tilting patella.  No patella apprehension.  No crepitation in the medial and lateral femoral condyle.  No proximal or distal swelling, edema or tenderness.  No gross motor or sensory deficits.  Mild intra-articular swelling.  2+ DP and PT pulses.  No varus or valgus malalignment.  Skin is intact.  No rashes, scars or lesions.   \par   [de-identified] : Gait and Station:  Ambulating with a slightly antalgic to antalgic gait.  Normal Station.  [de-identified] : Appearance:  Well developed, well-nourished female in no acute distress.\par   [de-identified] : Review of outside x-rays of the left knee reveal no acute fractures or dislocations.\par

## 2019-12-02 ENCOUNTER — FORM ENCOUNTER (OUTPATIENT)
Age: 54
End: 2019-12-02

## 2019-12-03 ENCOUNTER — APPOINTMENT (OUTPATIENT)
Dept: MRI IMAGING | Facility: CLINIC | Age: 54
End: 2019-12-03
Payer: MEDICAID

## 2019-12-03 ENCOUNTER — OUTPATIENT (OUTPATIENT)
Dept: OUTPATIENT SERVICES | Facility: HOSPITAL | Age: 54
LOS: 1 days | End: 2019-12-03
Payer: MEDICAID

## 2019-12-03 DIAGNOSIS — Z00.8 ENCOUNTER FOR OTHER GENERAL EXAMINATION: ICD-10-CM

## 2019-12-03 PROCEDURE — 73721 MRI JNT OF LWR EXTRE W/O DYE: CPT | Mod: 26,LT

## 2019-12-03 PROCEDURE — 73721 MRI JNT OF LWR EXTRE W/O DYE: CPT

## 2019-12-04 ENCOUNTER — RX RENEWAL (OUTPATIENT)
Age: 54
End: 2019-12-04

## 2019-12-09 ENCOUNTER — APPOINTMENT (OUTPATIENT)
Dept: ORTHOPEDIC SURGERY | Facility: CLINIC | Age: 54
End: 2019-12-09
Payer: MEDICAID

## 2019-12-09 VITALS
HEART RATE: 79 BPM | DIASTOLIC BLOOD PRESSURE: 80 MMHG | WEIGHT: 206 LBS | HEIGHT: 59 IN | BODY MASS INDEX: 41.53 KG/M2 | TEMPERATURE: 98.4 F | SYSTOLIC BLOOD PRESSURE: 141 MMHG

## 2019-12-09 DIAGNOSIS — M70.52 OTHER BURSITIS OF KNEE, LEFT KNEE: ICD-10-CM

## 2019-12-09 DIAGNOSIS — S83.207A UNSPECIFIED TEAR OF UNSPECIFIED MENISCUS, CURRENT INJURY, LEFT KNEE, INITIAL ENCOUNTER: ICD-10-CM

## 2019-12-09 PROCEDURE — 99213 OFFICE O/P EST LOW 20 MIN: CPT

## 2019-12-12 NOTE — ADDENDUM
[FreeTextEntry1] : This note was written by Erika Romero on 12/11/2019 acting as a scribe for LESLYE MCLEOD III, MD

## 2019-12-12 NOTE — DISCUSSION/SUMMARY
[de-identified] : At this time, due to osteoarthritis of the left knee with medial meniscus tear and pes bursitis, I recommended she undergo a course of physical therapy and anti-inflammatory.  She will be reassessed in four to six weeks.

## 2019-12-12 NOTE — PHYSICAL EXAM
[de-identified] : Left Knee: \par Range of Motion in Degrees	\par 	                  Claimant:	Normal:	\par Flexion Active	  135 	               135-degrees	\par Flexion Passive	  135	               135-degrees	\par Extension Active	  0-5	               0-5-degrees	\par Extension Passive     0-5	               0-5-degrees	\par \par No weakness to flexion/extension.  Tenderness over the pes bursa. No evidence of instability in the AP plane or varus or valgus stress.  Negative  Lachman.  Negative pivot shift.  Negative anterior drawer test.  Negative posterior drawer test.  Positive Dagoberto.  Positive Apley grind.  Positive medial joint line tenderness.  Negative lateral joint line tenderness.  Positive tenderness over the medial and lateral facet of the patella.  Positive patellofemoral crepitations.  No lateral tilting patella.  No patellar apprehension.  Positive crepitation in the medial and lateral femoral condyle.  No proximal or distal swelling, edema or tenderness.  No gross motor or sensory deficits.  Mild intra-articular swelling.  2+ DP and PT pulses.  No varus or valgus malalignment.  Skin is intact.  No rashes, scars or lesions. \par     [de-identified] : Ambulating with a slightly antalgic to antalgic gait.  Station:  Normal.  [de-identified] : Appearance:  Well-developed, well-nourished female in no acute distress.\par \par   [de-identified] : Review of MRI of the left knee, shows a degenerative partial tear in the posterior horn of the medial meniscus with osteoarthritic changes.

## 2019-12-16 ENCOUNTER — APPOINTMENT (OUTPATIENT)
Dept: UROLOGY | Facility: CLINIC | Age: 54
End: 2019-12-16
Payer: MEDICAID

## 2019-12-16 ENCOUNTER — APPOINTMENT (OUTPATIENT)
Dept: UROLOGY | Facility: CLINIC | Age: 54
End: 2019-12-16

## 2019-12-16 VITALS
OXYGEN SATURATION: 98 % | HEIGHT: 59 IN | BODY MASS INDEX: 41.53 KG/M2 | RESPIRATION RATE: 18 BRPM | TEMPERATURE: 98.1 F | DIASTOLIC BLOOD PRESSURE: 84 MMHG | HEART RATE: 72 BPM | WEIGHT: 206 LBS | SYSTOLIC BLOOD PRESSURE: 131 MMHG

## 2019-12-16 DIAGNOSIS — R35.0 FREQUENCY OF MICTURITION: ICD-10-CM

## 2019-12-16 PROCEDURE — 99204 OFFICE O/P NEW MOD 45 MIN: CPT

## 2019-12-17 LAB
APPEARANCE: CLEAR
BACTERIA: NEGATIVE
BILIRUBIN URINE: NEGATIVE
BLOOD URINE: NEGATIVE
COLOR: NORMAL
GLUCOSE QUALITATIVE U: NEGATIVE
HYALINE CASTS: 0 /LPF
KETONES URINE: NEGATIVE
LEUKOCYTE ESTERASE URINE: NEGATIVE
MICROSCOPIC-UA: NORMAL
NITRITE URINE: NEGATIVE
PH URINE: 6.5
PROTEIN URINE: NEGATIVE
RED BLOOD CELLS URINE: 3 /HPF
SPECIFIC GRAVITY URINE: 1.01
SQUAMOUS EPITHELIAL CELLS: 1 /HPF
UROBILINOGEN URINE: NORMAL
WHITE BLOOD CELLS URINE: 0 /HPF

## 2019-12-18 LAB — BACTERIA UR CULT: NORMAL

## 2019-12-18 NOTE — LETTER BODY
[Dear  ___] : Dear ~CHRISTINA, [Consult Letter:] : I had the pleasure of evaluating your patient, [unfilled]. [Please see my note below.] : Please see my note below. [Consult Closing:] : Thank you very much for allowing me to participate in the care of this patient.  If you have any questions, please do not hesitate to contact me. [FreeTextEntry2] : Florida Arriaga M.D.\par [facility]\par [address]\[place] [address] [Sincerely,] : Sincerely,

## 2019-12-18 NOTE — PHYSICAL EXAM
[General Appearance - Well Developed] : well developed [General Appearance - Well Nourished] : well nourished [Well Groomed] : well groomed [General Appearance - In No Acute Distress] : no acute distress [Normal Appearance] : normal appearance [Edema] : no peripheral edema [Respiration, Rhythm And Depth] : normal respiratory rhythm and effort [Exaggerated Use Of Accessory Muscles For Inspiration] : no accessory muscle use [Abdomen Soft] : soft [Abdomen Tenderness] : non-tender [Costovertebral Angle Tenderness] : no ~M costovertebral angle tenderness [Urinary Bladder Findings] : the bladder was normal on palpation [Normal Station and Gait] : the gait and station were normal for the patient's age [] : no rash [Oriented To Time, Place, And Person] : oriented to person, place, and time [Affect] : the affect was normal [No Focal Deficits] : no focal deficits [Mood] : the mood was normal [Not Anxious] : not anxious [No Palpable Adenopathy] : no palpable adenopathy

## 2019-12-18 NOTE — HISTORY OF PRESENT ILLNESS
[FreeTextEntry1] : As patient complains of a long history of spastic bladder and urgency related incontinence. She has not really been treated her evaluated for this in the past. She does have some urgency related incontinence.

## 2019-12-18 NOTE — END OF VISIT
[FreeTextEntry3] : Labs are sent and she will follow up with cystoscopy and urodynamics. She was given some anticholinergic medications sample as well

## 2019-12-18 NOTE — REVIEW OF SYSTEMS
[Recent Weight Gain (___ Lbs)] : recent [unfilled] ~Ulb weight gain [Recent Weight Loss (___ Lbs)] : recent [unfilled] ~Ulb weight loss [Eyesight Problems] : eyesight problems [Heartburn] : heartburn [Presently in menopause ___] : presently in menopause [unfilled] [Seen by urologist before (Name)  ___] : Previously seen by a urologist: [unfilled] [Wake up at night to urinate  How many times?  ___] : wakes up to urinate [unfilled] times during the night [Strong urge to urinate] : strong urge to urinate [Leakage of urine with urgency] : leakage of urine with urgency [Leakage of urine with straining, coughing, laughing] : leakage of urine with straining, coughing, laughing [Limb Weakness] : limb weakness [Joint Pain] : joint pain [Hot Flashes] : hot flashes [Muscle Weakness] : muscle weakness [Difficulty Walking] : difficulty walking [see HPI] : see HPI [Negative] : Endocrine

## 2019-12-19 LAB — URINE CYTOLOGY: NORMAL

## 2020-01-05 ENCOUNTER — EMERGENCY (EMERGENCY)
Facility: HOSPITAL | Age: 55
LOS: 0 days | Discharge: HOME | End: 2020-01-05
Attending: EMERGENCY MEDICINE
Payer: MEDICAID

## 2020-01-05 VITALS
TEMPERATURE: 98 F | OXYGEN SATURATION: 99 % | HEART RATE: 62 BPM | DIASTOLIC BLOOD PRESSURE: 55 MMHG | RESPIRATION RATE: 16 BRPM | SYSTOLIC BLOOD PRESSURE: 116 MMHG

## 2020-01-05 VITALS
OXYGEN SATURATION: 95 % | RESPIRATION RATE: 16 BRPM | HEART RATE: 87 BPM | TEMPERATURE: 98 F | DIASTOLIC BLOOD PRESSURE: 85 MMHG | SYSTOLIC BLOOD PRESSURE: 145 MMHG

## 2020-01-05 DIAGNOSIS — Z88.0 ALLERGY STATUS TO PENICILLIN: ICD-10-CM

## 2020-01-05 DIAGNOSIS — I10 ESSENTIAL (PRIMARY) HYPERTENSION: ICD-10-CM

## 2020-01-05 DIAGNOSIS — R07.9 CHEST PAIN, UNSPECIFIED: ICD-10-CM

## 2020-01-05 DIAGNOSIS — R73.03 PREDIABETES: ICD-10-CM

## 2020-01-05 LAB
ALBUMIN SERPL ELPH-MCNC: 3.8 G/DL — SIGNIFICANT CHANGE UP (ref 3.3–5)
ALP SERPL-CCNC: 100 U/L — SIGNIFICANT CHANGE UP (ref 40–120)
ALT FLD-CCNC: 27 U/L — SIGNIFICANT CHANGE UP (ref 12–78)
ANION GAP SERPL CALC-SCNC: 5 MMOL/L — SIGNIFICANT CHANGE UP (ref 5–17)
APTT BLD: 30.9 SEC — SIGNIFICANT CHANGE UP (ref 27.5–36.3)
AST SERPL-CCNC: 11 U/L — LOW (ref 15–37)
BASOPHILS # BLD AUTO: 0.01 K/UL — SIGNIFICANT CHANGE UP (ref 0–0.2)
BASOPHILS NFR BLD AUTO: 0.2 % — SIGNIFICANT CHANGE UP (ref 0–2)
BILIRUB SERPL-MCNC: 0.2 MG/DL — SIGNIFICANT CHANGE UP (ref 0.2–1.2)
BUN SERPL-MCNC: 12 MG/DL — SIGNIFICANT CHANGE UP (ref 7–23)
CALCIUM SERPL-MCNC: 9.1 MG/DL — SIGNIFICANT CHANGE UP (ref 8.5–10.1)
CHLORIDE SERPL-SCNC: 105 MMOL/L — SIGNIFICANT CHANGE UP (ref 96–108)
CO2 SERPL-SCNC: 31 MMOL/L — SIGNIFICANT CHANGE UP (ref 22–31)
CREAT SERPL-MCNC: 0.91 MG/DL — SIGNIFICANT CHANGE UP (ref 0.5–1.3)
D DIMER BLD IA.RAPID-MCNC: 237 NG/ML DDU — HIGH
EOSINOPHIL # BLD AUTO: 0.07 K/UL — SIGNIFICANT CHANGE UP (ref 0–0.5)
EOSINOPHIL NFR BLD AUTO: 1.1 % — SIGNIFICANT CHANGE UP (ref 0–6)
GLUCOSE SERPL-MCNC: 90 MG/DL — SIGNIFICANT CHANGE UP (ref 70–99)
HCT VFR BLD CALC: 42.3 % — SIGNIFICANT CHANGE UP (ref 34.5–45)
HGB BLD-MCNC: 13.9 G/DL — SIGNIFICANT CHANGE UP (ref 11.5–15.5)
IMM GRANULOCYTES NFR BLD AUTO: 0.3 % — SIGNIFICANT CHANGE UP (ref 0–1.5)
INR BLD: 0.99 RATIO — SIGNIFICANT CHANGE UP (ref 0.88–1.16)
LYMPHOCYTES # BLD AUTO: 1.7 K/UL — SIGNIFICANT CHANGE UP (ref 1–3.3)
LYMPHOCYTES # BLD AUTO: 27.1 % — SIGNIFICANT CHANGE UP (ref 13–44)
MCHC RBC-ENTMCNC: 26.6 PG — LOW (ref 27–34)
MCHC RBC-ENTMCNC: 32.9 GM/DL — SIGNIFICANT CHANGE UP (ref 32–36)
MCV RBC AUTO: 81 FL — SIGNIFICANT CHANGE UP (ref 80–100)
MONOCYTES # BLD AUTO: 0.45 K/UL — SIGNIFICANT CHANGE UP (ref 0–0.9)
MONOCYTES NFR BLD AUTO: 7.2 % — SIGNIFICANT CHANGE UP (ref 2–14)
NEUTROPHILS # BLD AUTO: 4.03 K/UL — SIGNIFICANT CHANGE UP (ref 1.8–7.4)
NEUTROPHILS NFR BLD AUTO: 64.1 % — SIGNIFICANT CHANGE UP (ref 43–77)
PLATELET # BLD AUTO: 281 K/UL — SIGNIFICANT CHANGE UP (ref 150–400)
POTASSIUM SERPL-MCNC: 4.1 MMOL/L — SIGNIFICANT CHANGE UP (ref 3.5–5.3)
POTASSIUM SERPL-SCNC: 4.1 MMOL/L — SIGNIFICANT CHANGE UP (ref 3.5–5.3)
PROT SERPL-MCNC: 8 GM/DL — SIGNIFICANT CHANGE UP (ref 6–8.3)
PROTHROM AB SERPL-ACNC: 11 SEC — SIGNIFICANT CHANGE UP (ref 10–12.9)
RBC # BLD: 5.22 M/UL — HIGH (ref 3.8–5.2)
RBC # FLD: 14.1 % — SIGNIFICANT CHANGE UP (ref 10.3–14.5)
SODIUM SERPL-SCNC: 141 MMOL/L — SIGNIFICANT CHANGE UP (ref 135–145)
TROPONIN I SERPL-MCNC: <0.015 NG/ML — SIGNIFICANT CHANGE UP (ref 0.01–0.04)
WBC # BLD: 6.28 K/UL — SIGNIFICANT CHANGE UP (ref 3.8–10.5)
WBC # FLD AUTO: 6.28 K/UL — SIGNIFICANT CHANGE UP (ref 3.8–10.5)

## 2020-01-05 PROCEDURE — 93010 ELECTROCARDIOGRAM REPORT: CPT

## 2020-01-05 PROCEDURE — 85730 THROMBOPLASTIN TIME PARTIAL: CPT

## 2020-01-05 PROCEDURE — 71046 X-RAY EXAM CHEST 2 VIEWS: CPT | Mod: 26

## 2020-01-05 PROCEDURE — 84484 ASSAY OF TROPONIN QUANT: CPT

## 2020-01-05 PROCEDURE — 99284 EMERGENCY DEPT VISIT MOD MDM: CPT | Mod: 25

## 2020-01-05 PROCEDURE — 71046 X-RAY EXAM CHEST 2 VIEWS: CPT

## 2020-01-05 PROCEDURE — 80053 COMPREHEN METABOLIC PANEL: CPT

## 2020-01-05 PROCEDURE — 71275 CT ANGIOGRAPHY CHEST: CPT

## 2020-01-05 PROCEDURE — 85379 FIBRIN DEGRADATION QUANT: CPT

## 2020-01-05 PROCEDURE — 99284 EMERGENCY DEPT VISIT MOD MDM: CPT

## 2020-01-05 PROCEDURE — 85025 COMPLETE CBC W/AUTO DIFF WBC: CPT

## 2020-01-05 PROCEDURE — 85610 PROTHROMBIN TIME: CPT

## 2020-01-05 PROCEDURE — 71275 CT ANGIOGRAPHY CHEST: CPT | Mod: 26

## 2020-01-05 PROCEDURE — 93005 ELECTROCARDIOGRAM TRACING: CPT

## 2020-01-05 PROCEDURE — 36415 COLL VENOUS BLD VENIPUNCTURE: CPT

## 2020-01-05 NOTE — ED STATDOCS - PROGRESS NOTE DETAILS
53 y/o F with PMHx of HTN, Prediabetes  presenting to the ED c/o CP. Patient reports that she has been having achy intermittent CP for the past week that does not radiate. Patient came to the ED after she had another episode today. No alleviating or exacerbating factors. Denies any SOB, cough, sweating, dizziness. Patient follows with cardiologist Dr. Arriaga. Patient has no further complaints at this time.   Abbey Barfield PA-C Labs unremarkable.  Mildly elevated D DImer but neg CTA chest.  Two negative Troponins.  Pt. provided copies of labs and will follow up with her cardiologist.  Return precautions provided.  Abbey Barfield PA-C Labs unremarkable.  Mildly elevated D DImer but neg CTA chest.  Two negative Troponins.  Pt. provided copies of labs and will follow up with her cardiologist.  Pt has normal stress test two years ago.  Return precautions provided and understood.    Abbey Barfield PA-C

## 2020-01-05 NOTE — ED STATDOCS - CARE PROVIDER_API CALL
Kerwin Vital (DO)  Cardiology; Internal Medicine  [address] [place] [address]  Phone: [phone]  Fax: [phone]  Follow Up Time:

## 2020-01-05 NOTE — ED STATDOCS - PATIENT PORTAL LINK FT
You can access the FollowMyHealth Patient Portal offered by [facility] by registering at the following website: [email or web address]. By joining [email or web address]’s FollowMyHealth portal, you will also be able to view your health information using other applications (apps) compatible with our system.

## 2020-01-05 NOTE — ED ADULT NURSE REASSESSMENT NOTE - NS ED NURSE REASSESS COMMENT FT1
Report taken at the change of shift at bedside. Pt awake alert and oriented x4 resting comfortably in bed with no acute distress noted. Vitals stable. 2nd Trop drawn and sent to lab. Plan of care updated to pt and family at bedside. Denies cp,sob,ha,dz,n/v/d/fever/chills or urinary sx. Will cont to monitor for safety and comfort.

## 2020-01-05 NOTE — ED STATDOCS - OBJECTIVE STATEMENT
53 y/o F with PMHx of HTN, Prediabetes  presenting to the ED c/o CP. Patient reports that she has been having achy intermittent CP for the past week that does not radiate. Patient came to the ED after she had another episode today. No alleviating or exacerbating factors. Denies any SOB, cough, sweating, dizziness. Patient follows with cardiologist Dr. Arriaga. Patient has no further complaints at this time.

## 2020-01-05 NOTE — ED STATDOCS - CLINICAL SUMMARY MEDICAL DECISION MAKING FREE TEXT BOX
Labs unremarkable.  Mildly elevated D DImer but neg CTA chest.  Two negative Troponins.  Pt. provided copies of labs and will follow up with her cardiologist.  Return precautions provided.  Abbey Barfield PA-C

## 2020-01-13 ENCOUNTER — APPOINTMENT (OUTPATIENT)
Dept: UROLOGY | Facility: CLINIC | Age: 55
End: 2020-01-13
Payer: MEDICAID

## 2020-01-13 VITALS
HEART RATE: 82 BPM | WEIGHT: 204.38 LBS | HEIGHT: 59 IN | RESPIRATION RATE: 18 BRPM | DIASTOLIC BLOOD PRESSURE: 79 MMHG | OXYGEN SATURATION: 97 % | SYSTOLIC BLOOD PRESSURE: 141 MMHG | BODY MASS INDEX: 41.2 KG/M2 | TEMPERATURE: 98.1 F

## 2020-01-13 DIAGNOSIS — N39.3 STRESS INCONTINENCE (FEMALE) (MALE): ICD-10-CM

## 2020-01-13 PROCEDURE — 52285 CYSTOSCOPY AND TREATMENT: CPT

## 2020-01-14 ENCOUNTER — APPOINTMENT (OUTPATIENT)
Dept: RHEUMATOLOGY | Facility: CLINIC | Age: 55
End: 2020-01-14
Payer: MEDICAID

## 2020-01-14 VITALS
TEMPERATURE: 98.3 F | HEART RATE: 80 BPM | OXYGEN SATURATION: 99 % | BODY MASS INDEX: 41.3 KG/M2 | DIASTOLIC BLOOD PRESSURE: 75 MMHG | WEIGHT: 204.5 LBS | SYSTOLIC BLOOD PRESSURE: 140 MMHG

## 2020-01-14 DIAGNOSIS — M06.4 INFLAMMATORY POLYARTHROPATHY: ICD-10-CM

## 2020-01-14 PROCEDURE — 99214 OFFICE O/P EST MOD 30 MIN: CPT

## 2020-01-14 RX ORDER — OXYBUTYNIN CHLORIDE 10 MG/1
10 TABLET, EXTENDED RELEASE ORAL
Qty: 270 | Refills: 0 | Status: DISCONTINUED | COMMUNITY
Start: 2016-04-22 | End: 2020-01-14

## 2020-01-14 RX ORDER — NAPROXEN 375 MG/1
375 TABLET ORAL
Qty: 60 | Refills: 1 | Status: DISCONTINUED | COMMUNITY
Start: 2019-04-18 | End: 2020-01-14

## 2020-01-14 RX ORDER — NAPROXEN 375 MG/1
375 TABLET, DELAYED RELEASE ORAL
Qty: 180 | Refills: 1 | Status: DISCONTINUED | COMMUNITY
Start: 2019-12-04 | End: 2020-01-14

## 2020-01-16 NOTE — PHYSICAL EXAM
[General Appearance - Alert] : alert [General Appearance - Well Developed] : well developed [General Appearance - In No Acute Distress] : in no acute distress [General Appearance - Well-Appearing] : healthy appearing [Respiration, Rhythm And Depth] : normal respiratory rhythm and effort [Exaggerated Use Of Accessory Muscles For Inspiration] : no accessory muscle use [Auscultation Breath Sounds / Voice Sounds] : lungs were clear to auscultation bilaterally [Heart Rate And Rhythm] : heart rate was normal and rhythm regular [Heart Sounds] : normal S1 and S2 [Heart Sounds Gallop] : no gallops [Murmurs] : no murmurs [Heart Sounds Pericardial Friction Rub] : no pericardial rub [Edema] : there was no peripheral edema [Cervical Lymph Nodes Enlarged Posterior Bilaterally] : posterior cervical [Cervical Lymph Nodes Enlarged Anterior Bilaterally] : anterior cervical [Supraclavicular Lymph Nodes Enlarged Bilaterally] : supraclavicular [No CVA Tenderness] : no ~M costovertebral angle tenderness [No Spinal Tenderness] : no spinal tenderness [Abnormal Walk] : normal gait [Nail Clubbing] : no clubbing  or cyanosis of the fingernails [Motor Tone] : muscle strength and tone were normal [Musculoskeletal - Swelling] : no joint swelling seen [Skin Color & Pigmentation] : normal skin color and pigmentation [Skin Turgor] : normal skin turgor [] : no rash [No Focal Deficits] : no focal deficits [Oriented To Time, Place, And Person] : oriented to person, place, and time [Impaired Insight] : insight and judgment were intact [Affect] : the affect was normal [FreeTextEntry1] : fast, forced speech

## 2020-01-16 NOTE — HISTORY OF PRESENT ILLNESS
[FreeTextEntry1] : 21\par - not sleeping well, severe GERD to ER recently ... increased to 20 mg BID \par - not taking any NSAIDs.., decongestants.. \par - recent severe CP cardiac w/u neg (EKG and enzymes).. CTA neg for PE now taking one baby 81 mg \par - HTN trouble getting under control trial amlodipine caused severe swelling \par - also stopped gabapentin 2/2 concerns fluid retention/ and ? HTN.. \par - also stopped oxybutynin/ Toviaz... caused severe migraines but was very effective.. \par - mild-moderate GARRET... but episodes few but very long..can't tolerate CPAP.. working w/ dentist for appliance \par - diffuse arthralgias now resolved, only change is stopping diet soda and white starch... no need for diclofenac\par - extremely worried about persistent HTN... mild and inability to tolerate any Rx provided \par - fell on L knee dx w/ meniscal tear given steroid injection w/ + response, now in PT... \par - denies recent migraines, sob/ pickering, hx of TIA/ CVA/ MI/ or DVT- PE.. no bruising or cytopenias \par Labs 10/19 nl CBC, CMP and mildly elevated Lipids/ HbA1c 6.3 \par \par  \par \par 1) POsitive EV/ Chronic pain (FM):  1:160 H. 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  Intermittent episodes of radiculopathy only on L side (steroids required in past) - pain always worse after use... end of day\par Heel pain:  b/l L worse than R, xrays suggest osteoporosis.  \par \par 4) GERD/ hiatal hernia cannot miss PPI.  Ranitidine not helpful, tums (hasn't tried carafate) GI, Dr. 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  Associated w/ stiffness most significant inflammatory back sx L > R  30-60 mins but all other joints also stiff.  Usually on consistent for past year- no overt swelling in any joints-  Using Aleve 2-4 tabs daily w/ nearly complete relief. \par Hypersensitivity to touch\par Migraines in past,  3 elective ab, 1 1st trimester, no DVT, PE, MI, CVA\par Recent Cardiac stress test for long term H2/ PPI and obesity  \par NO personal or family hx of psoriais, IBD, inflammatory eye dz, AS \par Long hx LBP- localized so severe can't walk, at times can't tolerate more than 15-30 mins.  Intermittent episodes of radiculopathy only on L side (steroids required in past) \par Heel pain:  b/l L worse than R, xrays suggest osteoporosis.  MRI pending \par w/ significant hot flashes for past year- periods irreg- LMP 6 mnths ago\par Significant issue w/ dyscognition\par Lives with GERD/ hiatal hernia cannot miss PPI.  Ranitidine not helpful, tums (hasn't tried carafate) GI, Dr. Lopez otherwise no significant GI issues-\par Bladder:  IC overactive, incontinence doing well on oxyybutin- does have severe dry mouth with this, and exacerbates asthma.\par ROS:  no significant fatigue, no fevers, lymphandenopathy, dry mouth drug related, no dry eyes; serositis, thinning in male pattern, mild intermittent cough r/t asthma.  no renal/ liver dysfunction\par + Lyme and HPV in past   \par \par Asthma:  fairly well controlled on daily H2- allegra D daily- nothing else, poor tolerance in past.  Rare need for inhalers.  \par NOTE:  doesn't tolerate steroids well- everything feels miserable with change in mental status- fogginess and altered personality, migraines in past  \par \par Bone Health \par FH:  + maternal aunts w/ RA \par \par

## 2020-01-16 NOTE — ASSESSMENT
[FreeTextEntry1] : 53 yo perimenopausal obese wf w/ HLD, mild HTN, recent shingles/ and erhlichiosis (both tx), + LAC w/ intermittently low level ACL/ B2G... with hx 1 miscarriage; + EV but neg subserologies  c/o intermittent  joint pain- "everywhere" \par \par 1) POsitive EV:  1:160 H. w/ + LAC, + dsDNA low 35, nl C3/4 and high titer B2G IgM > 150 \par APS:\par 2016  + LAC w/ B2G IGM > 150, ACL IGM + and PTT slightly elevated w/ neg PTS\par Dec  2016  - LAC w/ neg ACL, nl PTT and neg PTS. \par Mar  2016  - LAC, + ACL IGM 24, B2G IGM > 150, neg PTS, nl PTT... \par 2019  - LAC (DRVVT) / + silica w/ low + ACL IgM nl PTT w/ all other studies neg/ nl \par  severe bleeding throughout, 1 miscarriage 1st trimester and 3 elective ab...no other cytopenias/ bleeding/ clotting dyscrasias... \par \par 2) Chronic pain, mild FM: diffuse for many ys managed well controlled at this point though fatigue persists\par - Sleep: confirmed mod GARRET w/ long episodes as well as severe  sleep latency up to 2-3 hs and then only 4-6 hs disrupted 2-4 x wakes..  Trial CPAP not tolerated, working on getting oral appliance.  Excellent response to gabapentin - everything better low dose but didn't continue 2/2 HTN... encouraged to resume. Still hasn't tried melatonin\par - Migraines in past but nothing to suggest APS otherwise....recent increase, working w/ neuro\par - CP:  nothing recently mild recent cardiac stress test for long term mild - negative,  H2/ PPI w/ + response  \par - Dyscognition:  Significant issue worse when feeling poorly.. and not sleeping \par - Bladder:  IC overactive, incontinence doing well on oxyybutin but stopped 2/2 concerns HTN now again frequency and frustrated  \par Off all NSAIDs 2/2 HTN... doesn't feel considerably worse\par - paresthesias/ post herpetic neuralgia L sided T 2 dermatome:  migratory, mild.. 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    Intermittent episodes of radiculopathy only on L side (steroids required in past) - pain always worse after use... end of day. Doing well today...\par Heel pain:  b/l L worse than R, xrays suggest osteoporosis.  \par \par 5) GERD/ hiatal hernia cannot miss PPI.  Ranitidine not helpful, tums (hasn't tried carafate) GI, Dr. Lopez otherwise no significant GI issues-will try sulcrafate now\par \par 6) Perimenopausal:  Perimenopausal sx severe at times w/ Dexa  nl mild intermittent sx now\par \par 7) Obesity:  BMI 38- now 39- 41 with nearly 10 lb wt gain over past several months.  Now with\par - HTN:  newly dx... advised to minimize use of NSAIds and salt.. try APAP for pain.. see below\par - HLD:  noted mild ... will need to monitor\par - T2DM:  HbA1c 6.3 ... needs careful monitoring here too\par \par Plan:\par - add carafate (sulcrafate) at least twice daily at dinner and bedtime...\par \par - you need to sleep.... hopefully this medication above will help.. if you keep pulling off the mask or can't tolerate the mouth piece.. let me know... \par \par - retry the lisinopril start w/  pill and make sure you can tolerate... if tolerated then increase to one full.. if not tolerated call your internist and likely return to cardiology\par \par - start prozac (fluoxetine) at 10 mg for now, if tolerated and necessary increase to 20 mg. Take in am, but if makes drowsy take at bedtime \par \par - was sleeping better with gabapentin low dose 300 mg this should not raise your blood pressure....  \par \par - try to minimize sodium intake (salty food.. don't add salt to food)... \par \par - go to a [employer] and I'll send results when i have them... \par \par - take gabapentin once nightly.. if needed you can increase to 2 tabs at night if you don't feel to groggy, if you do that should go away within a week or so.  Gabapentin:  helps to give restorative sleep once on correct dose; decreases anxiety, decreases pain (all types of pain) \par \par - still need to work on wt loss.. this will help decrease snoring, sleep apnea, joint pain and back pain.  \par \par - still need to f/u w/ pulmonary regarding sleep study \par \par - rto 3-6  months \par \par

## 2020-01-16 NOTE — REVIEW OF SYSTEMS
[Feeling Tired] : feeling tired [Heartburn] : heartburn [Arthralgias] : arthralgias [As Noted in HPI] : as noted in HPI [Sleep Disturbances] : sleep disturbances [Negative] : Endocrine [Skin Lesions] : no skin lesions [FreeTextEntry2] : overall function high but poor sleep as noted  [de-identified] : no infections, well healed  [de-identified] : T 2 L sided post herpetic neuralgia resolved [de-identified] : as noted

## 2020-01-27 ENCOUNTER — APPOINTMENT (OUTPATIENT)
Dept: UROLOGY | Facility: CLINIC | Age: 55
End: 2020-01-27

## 2020-01-28 PROBLEM — I10 ESSENTIAL (PRIMARY) HYPERTENSION: Chronic | Status: ACTIVE | Noted: 2020-01-05

## 2020-01-28 PROBLEM — R73.03 PREDIABETES: Chronic | Status: ACTIVE | Noted: 2020-01-05

## 2020-02-03 ENCOUNTER — APPOINTMENT (OUTPATIENT)
Dept: ORTHOPEDIC SURGERY | Facility: CLINIC | Age: 55
End: 2020-02-03
Payer: MEDICAID

## 2020-02-03 VITALS
DIASTOLIC BLOOD PRESSURE: 81 MMHG | HEART RATE: 84 BPM | WEIGHT: 204 LBS | HEIGHT: 59 IN | SYSTOLIC BLOOD PRESSURE: 125 MMHG | BODY MASS INDEX: 41.12 KG/M2 | TEMPERATURE: 98.9 F

## 2020-02-03 DIAGNOSIS — M17.12 UNILATERAL PRIMARY OSTEOARTHRITIS, LEFT KNEE: ICD-10-CM

## 2020-02-03 PROCEDURE — 99212 OFFICE O/P EST SF 10 MIN: CPT

## 2020-02-06 ENCOUNTER — LABORATORY RESULT (OUTPATIENT)
Age: 55
End: 2020-02-06

## 2020-02-10 ENCOUNTER — APPOINTMENT (OUTPATIENT)
Dept: UROLOGY | Facility: CLINIC | Age: 55
End: 2020-02-10
Payer: MEDICAID

## 2020-02-10 VITALS
HEART RATE: 88 BPM | SYSTOLIC BLOOD PRESSURE: 130 MMHG | OXYGEN SATURATION: 99 % | BODY MASS INDEX: 41.12 KG/M2 | DIASTOLIC BLOOD PRESSURE: 93 MMHG | HEIGHT: 59 IN | WEIGHT: 204 LBS

## 2020-02-10 DIAGNOSIS — B02.23 POSTHERPETIC POLYNEUROPATHY: ICD-10-CM

## 2020-02-10 DIAGNOSIS — R35.0 FREQUENCY OF MICTURITION: ICD-10-CM

## 2020-02-10 DIAGNOSIS — R39.15 URGENCY OF URINATION: ICD-10-CM

## 2020-02-10 PROCEDURE — 51797 INTRAABDOMINAL PRESSURE TEST: CPT

## 2020-02-10 PROCEDURE — 51798 US URINE CAPACITY MEASURE: CPT | Mod: 59

## 2020-02-10 PROCEDURE — 51741 ELECTRO-UROFLOWMETRY FIRST: CPT

## 2020-02-10 PROCEDURE — 51784 ANAL/URINARY MUSCLE STUDY: CPT

## 2020-02-10 PROCEDURE — 51728 CYSTOMETROGRAM W/VP: CPT

## 2020-02-10 NOTE — DISCUSSION/SUMMARY
[de-identified] : At this time, the patient is doing well due to osteoarthritis of left knee, she is instructed on home therapeutic modalities.  She will follow up on an as needed basis.

## 2020-02-10 NOTE — PHYSICAL EXAM
[de-identified] : Ambulating with a slightly antalgic to antalgic gait.  Station:  Normal.  [de-identified] : Left Knee: \par Range of Motion in Degrees	\par 	                  Claimant:	Normal:	\par Flexion Active	  135 	                135-degrees	\par Flexion Passive	  135	                135-degrees	\par Extension Active	  0-5	                0-5-degrees	\par Extension Passive	  0-5	                0-5-degrees	\par \par No weakness to flexion/extension.  No evidence of instability in the AP plane or varus or valgus stress.  Negative  Lachman.  Negative pivot shift.  Negative anterior drawer test.  Negative posterior drawer test.  Negative Dagoberto.  Negative Apley grind.  No medial or lateral joint line tenderness.  Positive tenderness over the medial and lateral facet of the patella.  Positive patellofemoral crepitations.  No lateral tilting patella.  No patella apprehension.  Positive crepitation in the medial and lateral femoral condyle.  No proximal or distal swelling, edema or tenderness.  No gross motor or sensory deficits.  Mild intra-articular swelling.  2+ DP and PT pulses.  No varus or valgus malalignment.  Skin is intact.  No rashes, scars or lesions.  \par  [de-identified] : Appearance:  Well-developed, well-nourished female in no acute distress.\par \par  \par

## 2020-02-10 NOTE — ADDENDUM
[FreeTextEntry1] : This note was written by Erika Romero on 02/10/2020 acting as a scribe for LESLYE MCLEOD III, MD

## 2020-02-15 PROBLEM — B02.23 SHINGLES (HERPES ZOSTER) POLYNEUROPATHY: Status: ACTIVE | Noted: 2019-10-10

## 2020-02-15 PROBLEM — R39.15 URGENCY OF MICTURITION: Status: ACTIVE | Noted: 2019-12-16

## 2020-02-15 LAB
25(OH)D3 SERPL-MCNC: 26.9 NG/ML
ALBUMIN MFR SERPL ELPH: 58.1 %
ALBUMIN SERPL-MCNC: 4.2 G/DL
ALBUMIN/GLOB SERPL: 1.4 RATIO
ALPHA1 GLOB MFR SERPL ELPH: 3.9 %
ALPHA1 GLOB SERPL ELPH-MCNC: 0.3 G/DL
ALPHA2 GLOB MFR SERPL ELPH: 10.8 %
ALPHA2 GLOB SERPL ELPH-MCNC: 0.8 G/DL
ANA SER IF-ACNC: NEGATIVE
B-GLOBULIN MFR SERPL ELPH: 13 %
B-GLOBULIN SERPL ELPH-MCNC: 0.9 G/DL
B2 GLYCOPROT1 IGA SERPL IA-ACNC: 6.6 SAU
C3 SERPL-MCNC: 190 MG/DL
C4 SERPL-MCNC: 40 MG/DL
CARDIOLIPIN AB SER IA-ACNC: POSITIVE
CK SERPL-CCNC: 84 U/L
CONFIRM: 28.4 SEC
CRP SERPL-MCNC: 0.78 MG/DL
DRVVT IMM 1:2 NP PPP: ABNORMAL
DRVVT SCREEN TO CONFIRM RATIO: 1.48 RATIO
DSDNA AB SER-ACNC: 44 IU/ML
ERYTHROCYTE [SEDIMENTATION RATE] IN BLOOD BY WESTERGREN METHOD: 67 MM/HR
ESTIMATED AVERAGE GLUCOSE: 128 MG/DL
GAMMA GLOB FLD ELPH-MCNC: 1 G/DL
GAMMA GLOB MFR SERPL ELPH: 14.2 %
HBA1C MFR BLD HPLC: 6.1 %
INTERPRETATION SERPL IEP-IMP: NORMAL
PROT SERPL-MCNC: 7.2 G/DL
PROT SERPL-MCNC: 7.2 G/DL
PS IGA SER QL: <20 U/ML
PS IGG SER QL: <10 U/ML
PS IGM SER QL: 89 U/ML
SCREEN DRVVT: 46.5 SEC
SILICA CLOTTING TIME INTERPRETATION: ABNORMAL
SILICA CLOTTING TIME S/C: 1.24 RATIO
TSH SERPL-ACNC: 1.05 UIU/ML

## 2020-03-10 ENCOUNTER — APPOINTMENT (OUTPATIENT)
Dept: UROLOGY | Facility: CLINIC | Age: 55
End: 2020-03-10
Payer: MEDICAID

## 2020-03-10 VITALS
SYSTOLIC BLOOD PRESSURE: 121 MMHG | HEART RATE: 77 BPM | HEIGHT: 59 IN | BODY MASS INDEX: 41.53 KG/M2 | OXYGEN SATURATION: 99 % | DIASTOLIC BLOOD PRESSURE: 72 MMHG | TEMPERATURE: 98.1 F | WEIGHT: 206 LBS

## 2020-03-10 DIAGNOSIS — N95.2 POSTMENOPAUSAL ATROPHIC VAGINITIS: ICD-10-CM

## 2020-03-10 DIAGNOSIS — R35.0 FREQUENCY OF MICTURITION: ICD-10-CM

## 2020-03-10 PROCEDURE — 99215 OFFICE O/P EST HI 40 MIN: CPT

## 2020-03-19 LAB
A VAGINAE DNA VAG QL NAA+PROBE: NORMAL
APPEARANCE: CLEAR
BACTERIA UR CULT: NORMAL
BACTERIA: NEGATIVE
BILIRUBIN URINE: NEGATIVE
BLOOD URINE: NEGATIVE
BVAB2 DNA VAG QL NAA+PROBE: NORMAL
C KRUSEI DNA VAG QL NAA+PROBE: NEGATIVE
COLOR: NORMAL
GLUCOSE QUALITATIVE U: NEGATIVE
HYALINE CASTS: 1 /LPF
KETONES URINE: NEGATIVE
LEUKOCYTE ESTERASE URINE: NEGATIVE
MEGA1 DNA VAG QL NAA+PROBE: NORMAL
MICROSCOPIC-UA: NORMAL
NITRITE URINE: NEGATIVE
PH URINE: 6.5
PROTEIN URINE: NEGATIVE
RED BLOOD CELLS URINE: 1 /HPF
SPECIFIC GRAVITY URINE: 1.02
SQUAMOUS EPITHELIAL CELLS: 1 /HPF
T VAGINALIS RRNA SPEC QL NAA+PROBE: NEGATIVE
UROBILINOGEN URINE: NORMAL
WHITE BLOOD CELLS URINE: 0 /HPF

## 2020-05-06 ENCOUNTER — RX RENEWAL (OUTPATIENT)
Age: 55
End: 2020-05-06

## 2020-07-28 ENCOUNTER — APPOINTMENT (OUTPATIENT)
Dept: UROLOGY | Facility: CLINIC | Age: 55
End: 2020-07-28
Payer: MEDICAID

## 2020-07-28 VITALS
TEMPERATURE: 98.2 F | HEART RATE: 80 BPM | BODY MASS INDEX: 42.94 KG/M2 | HEIGHT: 59 IN | OXYGEN SATURATION: 99 % | WEIGHT: 213 LBS | SYSTOLIC BLOOD PRESSURE: 126 MMHG | DIASTOLIC BLOOD PRESSURE: 75 MMHG

## 2020-07-28 DIAGNOSIS — N32.81 OVERACTIVE BLADDER: ICD-10-CM

## 2020-07-28 DIAGNOSIS — N39.46 MIXED INCONTINENCE: ICD-10-CM

## 2020-07-28 PROCEDURE — 99213 OFFICE O/P EST LOW 20 MIN: CPT

## 2020-07-28 NOTE — HISTORY OF PRESENT ILLNESS
[FreeTextEntry1] : Takes oxybutynin if BP is low. \par RAFFY has gotten worse. \par Did not do any dietary changes. Did not get Prelief.

## 2020-08-05 ENCOUNTER — RX RENEWAL (OUTPATIENT)
Age: 55
End: 2020-08-05

## 2020-09-29 ENCOUNTER — APPOINTMENT (OUTPATIENT)
Dept: ALLERGY | Facility: CLINIC | Age: 55
End: 2020-09-29
Payer: MEDICAID

## 2020-09-29 VITALS
HEIGHT: 59 IN | DIASTOLIC BLOOD PRESSURE: 85 MMHG | WEIGHT: 215 LBS | HEART RATE: 93 BPM | BODY MASS INDEX: 43.34 KG/M2 | OXYGEN SATURATION: 96 % | SYSTOLIC BLOOD PRESSURE: 136 MMHG

## 2020-09-29 PROCEDURE — 95004 PERQ TESTS W/ALRGNC XTRCS: CPT

## 2020-09-29 PROCEDURE — 95018 ALL TSTG PERQ&IQ DRUGS/BIOL: CPT

## 2020-09-29 PROCEDURE — 99203 OFFICE O/P NEW LOW 30 MIN: CPT | Mod: 25

## 2020-09-29 PROCEDURE — 99204 OFFICE O/P NEW MOD 45 MIN: CPT | Mod: 25

## 2020-09-29 RX ORDER — GABAPENTIN 300 MG/1
300 CAPSULE ORAL
Qty: 120 | Refills: 2 | Status: DISCONTINUED | COMMUNITY
Start: 2019-10-10 | End: 2020-09-29

## 2020-09-29 RX ORDER — SUCRALFATE 1 G/1
1 TABLET ORAL
Qty: 120 | Refills: 2 | Status: DISCONTINUED | COMMUNITY
Start: 2020-01-14 | End: 2020-09-29

## 2020-09-29 RX ORDER — DICLOFENAC SODIUM 10 MG/G
1 GEL TOPICAL
Qty: 3 | Refills: 3 | Status: DISCONTINUED | COMMUNITY
Start: 2018-03-15 | End: 2020-09-29

## 2020-09-29 RX ORDER — FLUOXETINE HYDROCHLORIDE 10 MG/1
10 CAPSULE ORAL
Qty: 60 | Refills: 2 | Status: DISCONTINUED | COMMUNITY
Start: 2020-01-14 | End: 2020-09-29

## 2020-09-29 NOTE — HISTORY OF PRESENT ILLNESS
[Eczematous rashes] : eczematous rashes [Venom Reactions] : venom reactions [Food Allergies] : food allergies [de-identified] : Patient with ongoing coughing with vomiting for 8 months.   She is followed by pulmonary MD for sleep apnea - she is unable to use device.   CXR normal.   She than consulted with ENT - nasal endoscopy and sinus CT - deviated septum extending to other nostril.  She has been treated with azelastine - Flonase - Claritin - no significant improvement.   She uses Afrin 1-2 x per day.    She had GI evaluation - endoscopy - esophagitis - mild - gastritis - omeprazole 40 mg - famotidine 40 mg QHS - some improvement in her coughing with the addition of the famotidine.    She has removed dairy from her diet.   \par \par Patient feels that all nasal sprays do not change her coughing. \par \par Patient has been taking lisinopril x 1 year.\par \par Asthma in the past and she is on no medications.   \par \par Night time eating disorder - eats all food after 6 PM until she goes to sleep

## 2020-09-29 NOTE — REVIEW OF SYSTEMS
[Cough] : cough [Vomiting] : vomiting [Nl] : Genitourinary [FreeTextEntry6] : diagnosed with sleep apnea [de-identified] : night time eating disorder - previous anorexia and previous bulemic - eats from 6 PM until she goes to sleep

## 2020-09-29 NOTE — ASSESSMENT
[FreeTextEntry1] : GERD:\par \par GERD food avoidance sheet given to patient \par Continue omeprazole and famotidine\par Patient with night time over eating disorder which is probably contributing to her GI symptoms\par \par Rhinitis medicamentosa:\par \par Taper off Afrin\par Hypertonic saline irrigation\par RV repeat environmental skin testing off Claritin and azelastine\par \par Cough: Multifactorial - GERD - PND - rhinitis medicamentosa - possible lisinopril

## 2020-09-29 NOTE — SOCIAL HISTORY
[House] : [unfilled] lives in a house  [Radiator/Baseboard] : heating provided by radiator(s)/baseboard(s) [Window Units] : air conditioning provided by window units [Dog] : dog [] :  [de-identified] : 1 daughter  [FreeTextEntry2] :  and  - patient has very little work  [Bedroom] : not in the bedroom [Basement] : not in the basement [Living Area] : not in the living area [Smokers in Household] : there are no smokers in the home [de-identified] : x2 shedding

## 2020-09-29 NOTE — PHYSICAL EXAM
[Alert] : alert [Well Nourished] : well nourished [Healthy Appearance] : healthy appearance [No Acute Distress] : no acute distress [Well Developed] : well developed [Normal Pupil & Iris Size/Symmetry] : normal pupil and iris size and symmetry [No Discharge] : no discharge [No Photophobia] : no photophobia [Sclera Not Icteric] : sclera not icteric [Normal Nasal Mucosa] : the nasal mucosa was normal [Normal Lips/Tongue] : the lips and tongue were normal [Normal Tonsils] : normal tonsils [Normal Dentition] : normal dentition [No Oral Lesions or Ulcers] : no oral lesions or ulcers [Pale mucosa] : pale mucosa [Boggy Nasal Turbinates] : no boggy and/or pale nasal turbinates [Posterior Pharyngeal Cobblestoning] : no posterior pharyngeal cobblestoning [No Neck Mass] : no neck mass was observed [No LAD] : no lymphadenopathy [No Thyroid Mass] : no thyroid mass [Supple] : the neck was supple [Normal Rate and Effort] : normal respiratory rhythm and effort [No Crackles] : no crackles [No Retractions] : no retractions [Bilateral Audible Breath Sounds] : bilateral audible breath sounds [Normal Rate] : heart rate was normal  [Normal S1, S2] : normal S1 and S2 [No murmur] : no murmur [Regular Rhythm] : with a regular rhythm [Normal Cervical Lymph Nodes] : cervical [Skin Intact] : skin intact  [No Rash] : no rash [No Skin Lesions] : no skin lesions [No Joint Swelling or Erythema] : no joint swelling or erythema [No clubbing] : no clubbing [No Edema] : no edema [No Cyanosis] : no cyanosis [Normal Mood] : mood was normal [Normal Affect] : affect was normal [Alert, Awake, Oriented as Age-Appropriate] : alert, awake, oriented as age appropriate

## 2020-10-06 ENCOUNTER — APPOINTMENT (OUTPATIENT)
Dept: ALLERGY | Facility: CLINIC | Age: 55
End: 2020-10-06
Payer: MEDICAID

## 2020-10-06 VITALS
WEIGHT: 216 LBS | OXYGEN SATURATION: 96 % | SYSTOLIC BLOOD PRESSURE: 131 MMHG | DIASTOLIC BLOOD PRESSURE: 78 MMHG | HEIGHT: 59 IN | BODY MASS INDEX: 43.55 KG/M2 | HEART RATE: 93 BPM

## 2020-10-06 PROCEDURE — 99213 OFFICE O/P EST LOW 20 MIN: CPT | Mod: 25

## 2020-10-06 PROCEDURE — 95018 ALL TSTG PERQ&IQ DRUGS/BIOL: CPT

## 2020-10-06 PROCEDURE — 95004 PERQ TESTS W/ALRGNC XTRCS: CPT

## 2020-10-06 NOTE — PHYSICAL EXAM
[Alert] : alert [Well Nourished] : well nourished [Healthy Appearance] : healthy appearance [No Acute Distress] : no acute distress [Well Developed] : well developed [Normal Pupil & Iris Size/Symmetry] : normal pupil and iris size and symmetry [No Discharge] : no discharge [No Photophobia] : no photophobia [Sclera Not Icteric] : sclera not icteric [Normal Nasal Mucosa] : the nasal mucosa was normal [Normal Lips/Tongue] : the lips and tongue were normal [Normal Tonsils] : normal tonsils [Normal Dentition] : normal dentition [No Oral Lesions or Ulcers] : no oral lesions or ulcers [Pale mucosa] : pale mucosa [No Neck Mass] : no neck mass was observed [No LAD] : no lymphadenopathy [No Thyroid Mass] : no thyroid mass [Supple] : the neck was supple [Normal Rate and Effort] : normal respiratory rhythm and effort [No Crackles] : no crackles [No Retractions] : no retractions [Bilateral Audible Breath Sounds] : bilateral audible breath sounds [Normal Rate] : heart rate was normal  [Normal S1, S2] : normal S1 and S2 [No murmur] : no murmur [Regular Rhythm] : with a regular rhythm [Normal Cervical Lymph Nodes] : cervical [Skin Intact] : skin intact  [No Rash] : no rash [No Skin Lesions] : no skin lesions [No Joint Swelling or Erythema] : no joint swelling or erythema [No clubbing] : no clubbing [No Edema] : no edema [No Cyanosis] : no cyanosis [Normal Mood] : mood was normal [Normal Affect] : affect was normal [Alert, Awake, Oriented as Age-Appropriate] : alert, awake, oriented as age appropriate [Boggy Nasal Turbinates] : no boggy and/or pale nasal turbinates [Posterior Pharyngeal Cobblestoning] : no posterior pharyngeal cobblestoning

## 2020-10-06 NOTE — HISTORY OF PRESENT ILLNESS
[de-identified] : Patient is off Claritin and azelastine - she reports dramatic improvement in her coughing - she has tapered off Afrin and she is waiting for the saline irrigation.

## 2020-10-06 NOTE — ASSESSMENT
[FreeTextEntry1] : Cough multifactorial:\par \par GERD - patient will continue with present medical therapy and try to lessen over eating at night time. \par \par PND - RV for environmental intradermal skin testing \par           Start hypertonic saline irrigation.

## 2020-10-06 NOTE — REVIEW OF SYSTEMS
[Cough] : cough [Vomiting] : vomiting [Nl] : Genitourinary [FreeTextEntry6] : diagnosed with sleep apnea [de-identified] : night time eating disorder - previous anorexia and previous bulemic - eats from 6 PM until she goes to sleep

## 2020-10-06 NOTE — SOCIAL HISTORY
[House] : [unfilled] lives in a house  [Radiator/Baseboard] : heating provided by radiator(s)/baseboard(s) [Window Units] : air conditioning provided by window units [Dog] : dog [] :  [de-identified] : 1 daughter  [FreeTextEntry2] :  and  - patient has very little work  [Bedroom] : not in the bedroom [Basement] : not in the basement [Living Area] : not in the living area [Smokers in Household] : there are no smokers in the home [de-identified] : x2 shedding

## 2020-10-20 ENCOUNTER — APPOINTMENT (OUTPATIENT)
Dept: ALLERGY | Facility: CLINIC | Age: 55
End: 2020-10-20
Payer: MEDICAID

## 2020-10-20 VITALS
HEART RATE: 86 BPM | BODY MASS INDEX: 43.14 KG/M2 | SYSTOLIC BLOOD PRESSURE: 122 MMHG | WEIGHT: 214 LBS | OXYGEN SATURATION: 98 % | DIASTOLIC BLOOD PRESSURE: 85 MMHG | HEIGHT: 59 IN

## 2020-10-20 PROCEDURE — 95018 ALL TSTG PERQ&IQ DRUGS/BIOL: CPT

## 2020-10-20 PROCEDURE — 95024 IQ TESTS W/ALLERGENIC XTRCS: CPT

## 2020-10-20 PROCEDURE — 99072 ADDL SUPL MATRL&STAF TM PHE: CPT

## 2020-10-20 PROCEDURE — 99213 OFFICE O/P EST LOW 20 MIN: CPT | Mod: 25

## 2020-10-20 NOTE — ASSESSMENT
[FreeTextEntry1] : PND\par Rhinitis medicamentosa\par Coughing\par GERD\par \par Steroids make her feel suicidal and severe migraines therefore can not use in order to lessen the use of Afrin\par \par Flonase Sensimist 2 puffs each nostril BID\par Benadryl 50 mg bedtime\par Taper off Afrin\par Telehealth in 2 weeks\par

## 2020-10-20 NOTE — SOCIAL HISTORY
[de-identified] : 1 daughter  [House] : [unfilled] lives in a house  [FreeTextEntry2] :  and  - patient has very little work  [Radiator/Baseboard] : heating provided by radiator(s)/baseboard(s) [Window Units] : air conditioning provided by window units [Living Area] : not in the living area [Basement] : not in the basement [Bedroom] : not in the bedroom [Dog] : dog [] :  [de-identified] : x2 shedding  [Smokers in Household] : there are no smokers in the home

## 2020-10-20 NOTE — HISTORY OF PRESENT ILLNESS
[Asthma] : asthma [Allergic Rhinitis] : allergic rhinitis [Venom Reactions] : venom reactions [Food Allergies] : food allergies [Eczematous rashes] : eczematous rashes [de-identified] : Patient improves with Afrin - she has been using 3-4 x per week with improvement in her symptoms - the hypertonic saline was not helpful and she continues with Benadryl at night time.

## 2020-10-20 NOTE — REVIEW OF SYSTEMS
[Vomiting] : vomiting [Cough] : cough [de-identified] : night time eating disorder - previous anorexia and previous bulemic - eats from 6 PM until she goes to sleep  [FreeTextEntry6] : diagnosed with sleep apnea [Nl] : Genitourinary

## 2020-10-20 NOTE — PHYSICAL EXAM
[Well Nourished] : well nourished [Alert] : alert [Well Developed] : well developed [Healthy Appearance] : healthy appearance [No Acute Distress] : no acute distress [Normal Pupil & Iris Size/Symmetry] : normal pupil and iris size and symmetry [No Photophobia] : no photophobia [No Discharge] : no discharge [Sclera Not Icteric] : sclera not icteric [Normal Lips/Tongue] : the lips and tongue were normal [Normal Nasal Mucosa] : the nasal mucosa was normal [Normal Dentition] : normal dentition [Normal Tonsils] : normal tonsils [No Oral Lesions or Ulcers] : no oral lesions or ulcers [Pale mucosa] : pale mucosa [No Neck Mass] : no neck mass was observed [Posterior Pharyngeal Cobblestoning] : no posterior pharyngeal cobblestoning [Boggy Nasal Turbinates] : no boggy and/or pale nasal turbinates [No Thyroid Mass] : no thyroid mass [No LAD] : no lymphadenopathy [Supple] : the neck was supple [Normal Rate and Effort] : normal respiratory rhythm and effort [No Crackles] : no crackles [No Retractions] : no retractions [Bilateral Audible Breath Sounds] : bilateral audible breath sounds [Normal Rate] : heart rate was normal  [Normal S1, S2] : normal S1 and S2 [Normal Cervical Lymph Nodes] : cervical [No murmur] : no murmur [Regular Rhythm] : with a regular rhythm [Skin Intact] : skin intact  [No Rash] : no rash [No Skin Lesions] : no skin lesions [No clubbing] : no clubbing [No Joint Swelling or Erythema] : no joint swelling or erythema [No Cyanosis] : no cyanosis [Normal Mood] : mood was normal [No Edema] : no edema [Normal Affect] : affect was normal [Alert, Awake, Oriented as Age-Appropriate] : alert, awake, oriented as age appropriate

## 2020-12-14 ENCOUNTER — APPOINTMENT (OUTPATIENT)
Dept: ALLERGY | Facility: CLINIC | Age: 55
End: 2020-12-14
Payer: MEDICAID

## 2020-12-14 PROCEDURE — 99441: CPT

## 2020-12-14 NOTE — HISTORY OF PRESENT ILLNESS
[Home] : at home, [unfilled] , at the time of the visit. [Medical Office: ([facility])___] : at the medical office located in  [Verbal consent obtained from patient] : the patient, [unfilled] [de-identified] : Patient is improved but she continues to feel mucus in her throat - PND and coughing - she is off Afrin and is presently using Nasamist and Flonase Sensimist BID - she did have a sinus CT performed in the past before seeing me.

## 2020-12-14 NOTE — PHYSICAL EXAM
[Wheezing] : no wheezing was heard [Normal Mood] : mood was normal [Normal Affect] : affect was normal [Judgment and Insight Age Appropriate] : judgement and insight is age appropriate [Alert, Awake, Oriented as Age-Appropriate] : alert, awake, oriented as age appropriate [de-identified] : no audible wheeze

## 2020-12-14 NOTE — ASSESSMENT
[FreeTextEntry1] : Nonallergic rhinitis with PND and cough:\par \par Add Atrovent 0.03 % BID\par Email me copy of the sinus CT report\par Continue with Nasamist/Flonase Sensimist\par Follow up telephonic in 2-3 weeks \par \par Patient informed that this interaction with be a billable encounter\par \par Verbal consent given on 12-14-20 and 3:45 by Reese Izaguirre.\par \par The majority of time (>50%) was spent on counseling and coordination of care with this patient and/or family member.  The approximate physician face to face time was 7 minutes for the diagnosis of PND - cough - nonallergic rhinitis. \par \par

## 2020-12-22 ENCOUNTER — APPOINTMENT (OUTPATIENT)
Dept: RHEUMATOLOGY | Facility: CLINIC | Age: 55
End: 2020-12-22
Payer: MEDICAID

## 2020-12-22 VITALS
TEMPERATURE: 98.7 F | HEART RATE: 89 BPM | HEIGHT: 59 IN | BODY MASS INDEX: 43.55 KG/M2 | OXYGEN SATURATION: 97 % | DIASTOLIC BLOOD PRESSURE: 80 MMHG | SYSTOLIC BLOOD PRESSURE: 120 MMHG | WEIGHT: 216 LBS

## 2020-12-22 DIAGNOSIS — R25.2 CRAMP AND SPASM: ICD-10-CM

## 2020-12-22 PROCEDURE — 99213 OFFICE O/P EST LOW 20 MIN: CPT

## 2020-12-22 PROCEDURE — 99072 ADDL SUPL MATRL&STAF TM PHE: CPT

## 2020-12-24 NOTE — PHYSICAL EXAM
[General Appearance - Alert] : alert [General Appearance - In No Acute Distress] : in no acute distress [General Appearance - Well Developed] : well developed [General Appearance - Well-Appearing] : healthy appearing [Respiration, Rhythm And Depth] : normal respiratory rhythm and effort [Exaggerated Use Of Accessory Muscles For Inspiration] : no accessory muscle use [Auscultation Breath Sounds / Voice Sounds] : lungs were clear to auscultation bilaterally [Heart Rate And Rhythm] : heart rate was normal and rhythm regular [Heart Sounds] : normal S1 and S2 [Heart Sounds Gallop] : no gallops [Murmurs] : no murmurs [Heart Sounds Pericardial Friction Rub] : no pericardial rub [Edema] : there was no peripheral edema [Cervical Lymph Nodes Enlarged Posterior Bilaterally] : posterior cervical [Cervical Lymph Nodes Enlarged Anterior Bilaterally] : anterior cervical [Supraclavicular Lymph Nodes Enlarged Bilaterally] : supraclavicular [No CVA Tenderness] : no ~M costovertebral angle tenderness [No Spinal Tenderness] : no spinal tenderness [Abnormal Walk] : normal gait [Nail Clubbing] : no clubbing  or cyanosis of the fingernails [Musculoskeletal - Swelling] : no joint swelling seen [Motor Tone] : muscle strength and tone were normal [Skin Color & Pigmentation] : normal skin color and pigmentation [Skin Turgor] : normal skin turgor [] : no rash [No Focal Deficits] : no focal deficits [Oriented To Time, Place, And Person] : oriented to person, place, and time [Impaired Insight] : insight and judgment were intact [Affect] : the affect was normal [FreeTextEntry1] : fast speech but relaxed

## 2020-12-24 NOTE — REVIEW OF SYSTEMS
[Feeling Tired] : feeling tired [Heartburn] : heartburn [Arthralgias] : arthralgias [As Noted in HPI] : as noted in HPI [Sleep Disturbances] : sleep disturbances [Negative] : Heme/Lymph [Skin Lesions] : no skin lesions [FreeTextEntry2] : overall function high but poor sleep as noted  [de-identified] : no infections, well healed  [de-identified] : T 2 L sided post herpetic neuralgia resolved

## 2020-12-24 NOTE — ASSESSMENT
[FreeTextEntry1] : 54 yo perimenopausal obese wf w/ HLD, mild HTN, hx shingles/ and erhlichiosis (both tx), + LAC w/ intermittently low level ACL/ B2G... 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 w/ + LAC, + dsDNA low 35, nl C3/4 and high titer B2G IgM > 150 \par APS: continues to have no sx of bleeding/ clotting dyscrasia or cytopenias otherwise. Minimal c/o HA.. overall doing very well- in past presented w/ asymmetrical pauciarticular joint pain/ swelling. Nothing for past few ys\par No active/ recent inflammatory joint sx..  \par 2016  + LAC w/ B2G IGM > 150, ACL IGM + and PTT slightly elevated w/ neg PTS\par Dec  2016  - LAC w/ neg ACL, nl PTT and neg PTS. \par Mar  2016  - LAC, + ACL IGM 24, B2G IGM > 150, neg PTS, nl PTT... \par 2019  - LAC (DRVVT) / + silica w/ low + ACL IgM nl PTT w/ all other studies neg/ nl \par 2020   +LAC, (DRVVT), B2G IGM > 150, ACL IGM 28,  phosphatidyl IGM 89, w/ neg PS/PT antibodies,  dsDNA 44, high C3/4, ESR 64\par NOTE: \par  severe bleeding throughout, 1 miscarriage 1st trimester and 3 elective ab...no other cytopenias/ bleeding/ clotting dyscrasias... \par No personal or FH psoriasis, AS, inflammatory back, bowel or eye dz\par - doesn't tolerate steroids well- everything feels miserable with change in mental status- fogginess and altered personality\par \par 2) Chronic pain, mild FM: diffuse for many ys managed well controlled at this point though fatigue persists.  Off all NSAIDs 2/2 HTN... doesn't feel considerably worse.. topical NSAIDs + response will continue \par OVerall doing well past yr, does note some increase in muscle cramping severe day/ night.. no tx to date\par - Sleep: confirmed mod GARRET w/ long episodes as well as severe  sleep latency up to 2-3 hs and then only 4-6 hs disrupted 2-4 x wakes..  Trial CPAP not tolerated, working on getting oral appliance.  Excellent response to gabapentin - everything better low dose but didn't continue 2/2 HTN.and wt gain.. . Still hasn't tried melatonin\par - Migraines ifully resolved \par - CP:  nothing recently mild recent cardiac stress test for long term mild - negative,  H2/ PPI w/ + response  \par - Dyscognition:  Significant issue in past doing well now \par - Bladder:  IC overactive, incontinence doing well on oxyybutin but stopped 2/2 concerns HTN stable, tolerable \par - paresthesias/ post herpetic neuralgia L sided T 2 dermatome:  migratory, mild..\par - IBS:  D/ C controlled w/ prn hyoscyamine \par - Long hx LBP- localized when active  severe can't walk, at times can't tolerate more than 15-30 mins, doing well lately.    Intermittent episodes of radiculopathy only on L side (steroids required in past) - pain always worse after use... end of day. Doing well today...\par NOTE: \par - flexeril/ tizanidine not tolerated\par -  stopped gabapentin 2/2 concerns fluid retention/ and ? HTN.. \par \par 4) GERD/ hiatal hernia cannot miss PPI.  Ranitidine not helpful, tums (hasn't tried carafate) GI, Dr. Lopez otherwise no significant GI issues-sulcrafate now PRN\par \par 5) Perimenopausal:  Perimenopausal sx severe at times w/ Dexa  nl mild intermittent sx now\par \par 6) Obesity:  BMI 38- now 39- 41->43 with progressive increase over past few ys.  Now with\par - HTN:  newly dx... advised to minimize use of NSAIds and salt.. try APAP for pain.. see below\par - HLD:  noted mild ... will need to monitor\par - T2DM:  HbA1c 6.3 ... needs careful monitoring here too\par \par Plan:\par - continue prn carafate (sulcrafate) at least twice daily at dinner and bedtime...\par \par - you need to sleep...\par \par - add Mg supplement 250 mg BID .. for cramping, call if not effective, remain well hydrated as well.. \par \par - Continue wellbutrin 75 mg once daily \par \par - try to minimize sodium intake (salty food.. don't add salt to food)... \par \par - go to a [employer] \par \par - still need to work on wt loss.. this will help decrease snoring, sleep apnea, joint pain and back pain.  \par \par - still need to f/u w/ pulmonary regarding sleep study \par \par - rto 3-6  months \par \par

## 2020-12-24 NOTE — HISTORY OF PRESENT ILLNESS
[FreeTextEntry1] : 21\par - cotninues to have chronic cough/ which causes vomiting which improved w/ banafen at HS which helps sleep and as needed codeine cough medicine as needed.. + response..  \par - has been well for past yr.. \par - rare  CP cardiac w/u neg (EKG and enzymes).. CTA neg for PE now taking one baby 81 mg, comprehensive w/u neg... \par - HTN trouble getting under control trial amlodipine caused severe swelling \par - labs  all good, stable ACl/ B2G and LAC w/ neg PS/PT.. \par - L knee .. still uncomfortable but tolerable, given gel ... w/ no improvement..\par - recurrent day/ and night time muscle cramping severe at times..  \par - + response to voltaren gel.. would like renewal\par - denies recent migraines, sob/ pickering, hx of TIA/ CVA/ MI/ or DVT- PE.. no bruising or cytopenias \par \par  \par 1) POsitive EV/ Chronic pain (FM):  1:160 H. 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  \par \par 4) GERD/ hiatal hernia cannot miss PPI.  Ranitidine not helpful, tums (hasn't tried carafate) GI, Dr. 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  Associated w/ stiffness most significant inflammatory back sx L > R  30-60 mins but all other joints also stiff.  Usually on consistent for past year- no overt swelling in any joints-  Using Aleve 2-4 tabs daily w/ nearly complete relief. \par Hypersensitivity to touch\par Migraines in past,  3 elective ab, 1 1st trimester, no DVT, PE, MI, CVA\par Recent Cardiac stress test for long term H2/ PPI and obesity 2016 \par NO personal or family hx of psoriais, IBD, inflammatory eye dz, AS \par Long hx LBP- localized so severe can't walk, at times can't tolerate more than 15-30 mins.  Intermittent episodes of radiculopathy only on L side (steroids required in past) \par Heel pain:  b/l L worse than R, xrays suggest osteoporosis.  MRI pending \par w/ significant hot flashes for past year- periods irreg- LMP 6 mnths ago\par Significant issue w/ dyscognition\par Lives with GERD/ hiatal hernia cannot miss PPI.  Ranitidine not helpful, tums (hasn't tried carafate) GI, Dr. Lopez otherwise no significant GI issues-\par Bladder:  IC overactive, incontinence doing well on oxyybutin- does have severe dry mouth with this, and exacerbates asthma.\par ROS:  no significant fatigue, no fevers, lymphandenopathy, dry mouth drug related, no dry eyes; serositis, thinning in male pattern, mild intermittent cough r/t asthma.  no renal/ liver dysfunction\par + Lyme and HPV in past   \par \par Asthma:  fairly well controlled on daily H2- allegra D daily- nothing else, poor tolerance in past.  Rare need for inhalers.  \par NOTE:  doesn't tolerate steroids well- everything feels miserable with change in mental status- fogginess and altered personality, migraines in past  \par \par Bone Health \par FH:  + maternal aunts w/ RA \par \par

## 2021-01-07 ENCOUNTER — APPOINTMENT (OUTPATIENT)
Dept: ALLERGY | Facility: CLINIC | Age: 56
End: 2021-01-07
Payer: MEDICAID

## 2021-01-07 PROCEDURE — 99441: CPT

## 2021-01-07 NOTE — REASON FOR VISIT
[Routine Follow-Up] : a routine follow-up visit for [FreeTextEntry3] : follow up of nasal congestion

## 2021-01-07 NOTE — HISTORY OF PRESENT ILLNESS
[Home] : at home, [unfilled] , at the time of the visit. [Medical Office: ([facility])___] : at the medical office located in  [Verbal consent obtained from patient] : the patient, [unfilled] [de-identified] : Patient has improved with ipratropium BID but she still reports some coughing and episodic vomiting.

## 2021-01-07 NOTE — ASSESSMENT
[FreeTextEntry1] : PND with coughing:\par \par Increase Atrovent to TID \par Continue hypertonic saline irrigation\par Add sudafed 15 mg QD but can increase to BID if needed \par \par This visit was provided via telehealth using telephone..  The patient, Reese Izaguirre, was located at home,             at the time of the visit.   The provider, Mitchell Boxer, M.D., was located at the office, [address] [place] [address], at the time of the visit.\par \par The patient, Reese Izaguirre and physician, Mitchell Boxer, M.D., participated in the telephonic encounter.\par \par Verbal consent obtained by  from patient.\par \par Patient informed that this interaction with be a billable encounter\par \par Verbal consent given on 1-7-21 and 1:15 PM  by Reese Izaguirre.\par \par The majority of time (>50%) was spent on counseling and coordination of care with this patient and/or family member.  The approximate physician face to face time was 7 minutes for the diagnosis of PND. \par .\par \par

## 2021-02-09 ENCOUNTER — RX RENEWAL (OUTPATIENT)
Age: 56
End: 2021-02-09

## 2021-04-09 NOTE — ED ADULT NURSE NOTE - PAIN RATING/NUMBER SCALE (0-10): ACTIVITY
From: Bev Inman  To: Sai Simeon  Sent: 4/9/2021 11:23 AM CDT  Subject: Upcoming Appointment    I wanted to share my seizures information that the nurse document with you before the appointment.     Thanks Bev  
Spoke to pt, this is the report of the nurse where she works. She requests to call the nurse Sally. Called and left a message for the nurse   
5

## 2021-11-20 ENCOUNTER — TRANSCRIPTION ENCOUNTER (OUTPATIENT)
Age: 56
End: 2021-11-20

## 2021-12-01 PROCEDURE — G9005: CPT

## 2022-03-23 NOTE — ED STATDOCS - PRINCIPAL DIAGNOSIS
[FreeTextEntry1] : The patient had seen Dr. platt and was started on PPI and H2 blocker combination for GERD . The patient has a lung nodule on his CXR . He is going for CT scan of his chest. The patient has not had chest pain . or palpitations 
Chest pain

## 2022-03-25 ENCOUNTER — APPOINTMENT (OUTPATIENT)
Dept: OTOLARYNGOLOGY | Facility: CLINIC | Age: 57
End: 2022-03-25
Payer: MEDICAID

## 2022-03-25 VITALS
SYSTOLIC BLOOD PRESSURE: 145 MMHG | DIASTOLIC BLOOD PRESSURE: 86 MMHG | WEIGHT: 216 LBS | HEIGHT: 59 IN | TEMPERATURE: 97.7 F | BODY MASS INDEX: 43.55 KG/M2 | HEART RATE: 89 BPM

## 2022-03-25 DIAGNOSIS — R06.83 SNORING: ICD-10-CM

## 2022-03-25 PROCEDURE — 31231 NASAL ENDOSCOPY DX: CPT

## 2022-03-25 PROCEDURE — 99204 OFFICE O/P NEW MOD 45 MIN: CPT | Mod: 25

## 2022-03-25 NOTE — CONSULT LETTER
[Please see my note below.] : Please see my note below. [FreeTextEntry1] : Dear Dr. EZEKIEL CABELLO \par I had the pleasure of evaluating your patient ALE BECK, thank you for allowing us to participate in their care. please see full note detailing our visit below.\par If you have any questions, please do not hesitate to call me and I would be happy to discuss further. \par \par Arturo Ivory M.D.\par Attending Physician,  \par Department of Otolaryngology - Head and Neck Surgery\[facility] \par Office: [phone]\par Fax: [phone]\par \par

## 2022-03-25 NOTE — REVIEW OF SYSTEMS
[Seasonal Allergies] : seasonal allergies [Post Nasal Drip] : post nasal drip [Snoring With Pauses] : snoring with pauses [Negative] : Heme/Lymph [As Noted in HPI] : as noted in HPI [FreeTextEntry1] : sleep apnea

## 2022-03-25 NOTE — ASSESSMENT
[FreeTextEntry1] : 57 y/o F with hx of sleep apnea, now with nasal congestion. on exam mild septal deviation and bilateral turbinate hypertrophy. Also on exam +mark with nasal valve collapse with severe left caudal deflection \par - ordered CT scan to access sinuses \par -  Flonase. A topical steroid reduce mucosal swelling, illustrated appropriate use and how to reduce the risk of bleeding \par - Nasal irrigation and showed how to use it to maximize effectiveness \par - Risks benefits and alternatives of endoscopic sinus surgery with possible image guidance possible septoplasty bilateral inferior turbinate reduction discussed with patient at length. Risks discussed include but were not limited to bleeding, infection, persistent symptoms, scarring, injury to the skull base and brain and CSF leak, injury to orbit, crusting, septal hematoma, septal perforation results in whistling, crusting and bleeding as well as continued nasal obstruction etc. were discussed. Patient verbalized understanding of risks and benefits and also asked probing follow up questions which were answered to clarify details and get full understanding.\par - Risks benefits and alternatives to septonasal reconstruction and bilateral inferior turbinate reduction discussed. risks of bleeding, infection, septal hematoma, injury to the skull base, septal perforation results in whistling, crusting and bleeding as well as continued nasal obstruction, cosmetic deformity and collapse were discussed. Patient understood risks and would like to continue with the operation.\par Offered option of cosmetic reconstruction. patient  did not want to pay extra for the cosmetic piece and his main concern is his breathing\par can consider battens, strut,  grafts \par on allegra D for lots of d/c cannot get off \par pt very very anxious about decreased breathing, will have a  hard time in the post op period and may be unhappy with results as has breathing right now and very anxious about it. discussed widening, disucssed higher risk of empty nose with anxiety \par \par

## 2022-03-25 NOTE — END OF VISIT
[FreeTextEntry3] : I personally saw and examined ALE BECK in detail. I spoke to KHANH Parker regarding the assessment and plan of care.  I preformed the procedures and I reviewed the above assessment and plan of care, and agree. I have made changes in changes in the body of the note where appropriate.\par \par

## 2022-03-25 NOTE — PROCEDURE
[FreeTextEntry6] : Procedure performed: Nasal Endoscopy- Diagnostic\par Pre-op indication(s): nasal congestion\par Post-op indication(s): nasal congestion \par Verbal and/or written consent obtained from patient\par Anterior rhinoscopy insufficient to account for symptoms\par Scope #: 3,  flexible fiber optic telescope \par The scope was introduced in the nasal passage between the middle and inferior turbinates to exam the inferior portion of the middle meatus and the fontanelle, as well as the maxillary ostia.  Next, the scope was passed medically and posteriorly to the middle turbinates to examine the sphenoethmoid recess and the superior turbinate region.\par Upon visualization the finders are as follows:\par Nasal Septum: sigmoidal septal deviation with severe left caudal deflection, with nasal valve collapse \par Bilateral - Mucosa: boggy turbinates, Mucous: scant, Polyp: not seen, Inferior Turbinate: boggy, Middle Turbinate: BITH, Superior Turbinate: normal, Inferior Meatus: narrow, Middle Meatus: narrow, Super Meatus:normal, Sphenoethmoidal Recess: clear\par  [de-identified] : Procedure performed: laryngeal Endoscopy- Diagnostic\par Pre-op/post op indication: snoring \par Verbal and/or written consent obtained from patient, Patient was unable to cooperate with mirror\par Scope #: 3, flexible fiber optic telescope used \par Scope was introduced through the nose passed on the floor of the nose to the nasopharynx and then followed down the soft palate to the lower pharynx. The tongue Base, Larynx, Hypopharynx were examined.+FBOT, vallecular was clear, epiglottis was not deformed, subglottis/ pyriform and posterior pharyngeal walls were clear. No erythema, edema, pooling of secretions, masses or lesions. Airway patent, no foreign body visualized. No glottic/supraglottic edema. True vocal cords, arytenoids, vestibular folds, ventricles, pyriform sinuses, and aryepiglottic folds appear normal bilaterally. Vocal cords mobile with good contact b/l.\par \par

## 2022-03-25 NOTE — HISTORY OF PRESENT ILLNESS
[de-identified] : 55 y/o F with hx of sleep apnea now with b/l nasal congestion L>R x several years. \par pt with chronic rhinitis, states blows her nose a lot. \par pt uses nose strips to help her breathe, has used nasal sprays, and strips for congestion without any relief. \par pt was scheduled for surgery then got cancelled and couldn’t go through with surgery due to pandemic. \par pt has gotten tested for allergies, on allegra-d with relief. \par previous ct shows ethmoid sinus dz \par pt denies sinus pressure, sinus infections \par \par

## 2022-07-29 NOTE — ED STATDOCS - CARDIAC, MLM
Received call from Tiarra with [employer] [facility],    Patient has sore spot near sacral area, the skin is not broken, but it is tender to the touch and hurst when she sits.  Please advise.   normal rate, regular rhythm, and no murmur.

## 2022-09-09 ENCOUNTER — OUTPATIENT (OUTPATIENT)
Dept: OUTPATIENT SERVICES | Facility: HOSPITAL | Age: 57
LOS: 1 days | End: 2022-09-09
Payer: MEDICAID

## 2022-09-09 ENCOUNTER — APPOINTMENT (OUTPATIENT)
Dept: RADIOLOGY | Facility: CLINIC | Age: 57
End: 2022-09-09

## 2022-09-09 ENCOUNTER — APPOINTMENT (OUTPATIENT)
Dept: CT IMAGING | Facility: CLINIC | Age: 57
End: 2022-09-09

## 2022-09-09 DIAGNOSIS — E11.9 TYPE 2 DIABETES MELLITUS WITHOUT COMPLICATIONS: ICD-10-CM

## 2022-09-09 DIAGNOSIS — J32.4 CHRONIC PANSINUSITIS: ICD-10-CM

## 2022-09-09 DIAGNOSIS — Z00.8 ENCOUNTER FOR OTHER GENERAL EXAMINATION: ICD-10-CM

## 2022-09-09 PROCEDURE — 70486 CT MAXILLOFACIAL W/O DYE: CPT | Mod: 26

## 2022-09-09 PROCEDURE — 71046 X-RAY EXAM CHEST 2 VIEWS: CPT | Mod: 26

## 2022-09-09 PROCEDURE — 72070 X-RAY EXAM THORAC SPINE 2VWS: CPT | Mod: 26

## 2022-09-09 PROCEDURE — 71046 X-RAY EXAM CHEST 2 VIEWS: CPT

## 2022-09-09 PROCEDURE — 70486 CT MAXILLOFACIAL W/O DYE: CPT

## 2022-09-09 PROCEDURE — 72070 X-RAY EXAM THORAC SPINE 2VWS: CPT

## 2022-09-21 ENCOUNTER — NON-APPOINTMENT (OUTPATIENT)
Age: 57
End: 2022-09-21

## 2022-09-29 NOTE — ED ADULT NURSE NOTE - NS_ED_NURSE_TEACHING_TOPIC_ED_A_ED
47 yo male, umu speaking (language line  ID # [id number]) c no pmhx presents to ED c/o right leg pain x 3-4 months.  Pt states saw a doctor regarding this, had an outpatient MRI done and was told has a pinched nerve.  States was given an injection which did not help and has been taking tylenol with not much improvement.  pt states pain is worse when he is sitting but not when walking.  denies any bowel or bladder incontinence, weakness, numbness, fever. fall or trauma,  cp, sob, abd pain. 45 yo male, blake speaking (language line  ID # [id number]) c no pmhx presents to ED c/o right leg pain x 3-4 months.  Pt states saw a doctor regarding this, had an outpatient MRI done and was told has a pinched nerve.  States was given an injection which did not help and has been taking tylenol with not much improvement.  pt states pain is worse when he is sitting but not when walking.  denies any bowel or bladder incontinence, weakness, numbness, fever. fall or trauma,  cp, sob, abd pain.    Attending/Pranav: 45 yo M Blake speaking ( # [id number] Girma), h/o DM noncompliant with oral medication h/o intermittent low back pain radiation down RLE and now more frequently over the past three to four months. Symptoms improved with ambulation, worse when supine or sitting. Denies fever/chills, weakness, erectile dysfunction or change in urinary/bowel habits. Cardiac

## 2022-11-11 ENCOUNTER — APPOINTMENT (OUTPATIENT)
Dept: OTOLARYNGOLOGY | Facility: CLINIC | Age: 57
End: 2022-11-11

## 2022-11-11 VITALS — TEMPERATURE: 97.2 F | HEIGHT: 59 IN | WEIGHT: 216 LBS | BODY MASS INDEX: 43.55 KG/M2

## 2022-11-11 PROCEDURE — 99214 OFFICE O/P EST MOD 30 MIN: CPT | Mod: 25

## 2022-11-11 PROCEDURE — 31231 NASAL ENDOSCOPY DX: CPT

## 2022-11-11 NOTE — ASSESSMENT
[FreeTextEntry1] : 55 y/o F with hx of sleep apnea, now with nasal congestion. on exam mild septal deviation and bilateral turbinate hypertrophy. Also on exam +mark with nasal valve collapse with severe left caudal deflection - most of issue with breathing \par \par CT scan discussed and reviewed from 9/9/2022. CT impression: no acute sinusitis.\par \par Discussed with pt that she can start on medication management to see if this helps with nasal congestion and rhinorrhea.Discussed with pt surgery intervention to see if this helps with nasal congestion, however, pt appears very anxious about surgery - went over everything at great length \par \par Risks benefits and alternatives to septonasal reconstruction and bilateral inferior turbinate reduction discussed. Risks of bleeding, infection, septal hematoma, injury to the skull base, septal perforation results in whistling, permanent numbness in and around their nose, pain, discoloration or swelling that may persist, drying scarring crusting and bleeding as well as continued nasal obstruction, empty nose syndrome, cosmetic deformity, uneven-looking nose, collapse, scarring, synechiae, pollybeak deformity, rocker deformity, bone comminution, and need for revisionary surgery (patient explained that there is inherent revision rate to septorhinoplasty), osteitis, bleeding, infection, recurrent or persistent nasal deformity. Patient understood risks and would like to continue with the operation.\par Offered option of cosmetic reconstruction. patient  did not want to pay extra for the cosmetic piece and the concern is breathing\par will plan for left  and possible strut \par \par Pt very very anxious about decreased breathing, will have a hard time in the post op period and may be unhappy with results as has breathing right now and very anxious about it. Discussed widening, drying etc discussed higher risk of empty nose with anxiety \par \par \par \par

## 2022-11-11 NOTE — PROCEDURE
[FreeTextEntry6] : Procedure performed: Nasal Endoscopy- Diagnostic\par Pre-op indication(s): nasal congestion\par Post-op indication(s): nasal congestion \par Verbal and/or written consent obtained from patient\par Anterior rhinoscopy insufficient to account for symptoms\par Scope #: 3,  flexible fiber optic telescope \par The scope was introduced in the nasal passage between the middle and inferior turbinates to exam the inferior portion of the middle meatus and the fontanelle, as well as the maxillary ostia.  Next, the scope was passed medically and posteriorly to the middle turbinates to examine the sphenoethmoid recess and the superior turbinate region.\par Upon visualization the finders are as follows:\par Nasal Septum: sigmoidal septal deviation with severe left caudal deflection into the valve,  with nasal valve collapse \par Bilateral - Mucosa: boggy turbinates, Mucous: scant, Polyp: not seen, Inferior Turbinate: boggy, Middle Turbinate: BITH, Superior Turbinate: normal, Inferior Meatus: narrow, Middle Meatus: narrow, Super Meatus:normal, Sphenoethmoidal Recess: clear\par  [de-identified] : \par \par

## 2022-11-11 NOTE — HISTORY OF PRESENT ILLNESS
[de-identified] : 57 y/o F with hx of sleep apnea now with b/l nasal congestion L>R x several years. \par pt with chronic rhinitis, states blows her nose a lot. \par pt uses nose strips to help her breathe, has used nasal sprays, and strips for congestion without any relief. \par pt was scheduled for surgery then got cancelled and couldn’t go through with surgery due to pandemic. \par pt has gotten tested for allergies, on allegra-d with relief. \par previous ct shows ethmoid sinus dz \par pt denies sinus pressure, sinus infections \par \par  [FreeTextEntry1] : Pt continues to feel L sided congestion and is using mute and nose strips to help with breathing. Pt complains of rhinorrhea and drainage from the nose when she tips her head in the morning.

## 2022-11-11 NOTE — CONSULT LETTER
[Please see my note below.] : Please see my note below. [FreeTextEntry1] : Dear Dr. EZEKIEL CABELLO \par I had the pleasure of evaluating your patient ALE BECK, thank you for allowing us to participate in their care. please see full note detailing our visit below.\par If you have any questions, please do not hesitate to call me and I would be happy to discuss further. \par \par Arturo Ivory M.D.\par Attending Physician,  \par Department of Otolaryngology - Head and Neck Surgery\par [facility] \par Office: [phone]\par Fax: [phone]\par \par

## 2022-11-11 NOTE — REVIEW OF SYSTEMS
[Nasal Congestion] : nasal congestion [Discolored Nasal Discharge] : discolored nasal discharge [Negative] : Heme/Lymph [Seasonal Allergies] : seasonal allergies [Post Nasal Drip] : post nasal drip [As Noted in HPI] : as noted in HPI [Snoring With Pauses] : snoring with pauses [FreeTextEntry1] : sleep apnea

## 2022-12-02 ENCOUNTER — OUTPATIENT (OUTPATIENT)
Dept: OUTPATIENT SERVICES | Facility: HOSPITAL | Age: 57
LOS: 1 days | End: 2022-12-02
Payer: MEDICAID

## 2022-12-02 ENCOUNTER — APPOINTMENT (OUTPATIENT)
Dept: CT IMAGING | Facility: CLINIC | Age: 57
End: 2022-12-02

## 2022-12-02 DIAGNOSIS — R07.89 OTHER CHEST PAIN: ICD-10-CM

## 2022-12-02 PROCEDURE — 71250 CT THORAX DX C-: CPT

## 2022-12-02 PROCEDURE — 71250 CT THORAX DX C-: CPT | Mod: 26

## 2023-03-17 ENCOUNTER — APPOINTMENT (OUTPATIENT)
Dept: OTOLARYNGOLOGY | Facility: CLINIC | Age: 58
End: 2023-03-17
Payer: MEDICAID

## 2023-03-17 PROCEDURE — 99214 OFFICE O/P EST MOD 30 MIN: CPT | Mod: 25

## 2023-03-17 PROCEDURE — 31231 NASAL ENDOSCOPY DX: CPT

## 2023-03-17 RX ORDER — OMEPRAZOLE 20 MG/1
20 CAPSULE, DELAYED RELEASE ORAL
Refills: 0 | Status: ACTIVE | COMMUNITY

## 2023-03-17 RX ORDER — SEMAGLUTIDE 1.34 MG/ML
2 INJECTION, SOLUTION SUBCUTANEOUS
Refills: 0 | Status: ACTIVE | COMMUNITY

## 2023-03-17 RX ORDER — ATORVASTATIN CALCIUM 10 MG/1
10 TABLET, FILM COATED ORAL
Refills: 0 | Status: ACTIVE | COMMUNITY

## 2023-03-17 RX ORDER — LOSARTAN POTASSIUM 25 MG/1
25 TABLET, FILM COATED ORAL
Refills: 0 | Status: ACTIVE | COMMUNITY

## 2023-03-20 NOTE — REVIEW OF SYSTEMS
[Negative] : Heme/Lymph [Seasonal Allergies] : seasonal allergies [Post Nasal Drip] : post nasal drip [As Noted in HPI] : as noted in HPI [Nasal Congestion] : nasal congestion [Discolored Nasal Discharge] : discolored nasal discharge [Snoring With Pauses] : snoring with pauses [FreeTextEntry1] : sleep apnea

## 2023-03-20 NOTE — CONSULT LETTER
[Please see my note below.] : Please see my note below. [FreeTextEntry1] : Dear Dr. EZEKIEL CABELLO \par I had the pleasure of evaluating your patient ALE BECK, thank you for allowing us to participate in their care. please see full note detailing our visit below.\par If you have any questions, please do not hesitate to call me and I would be happy to discuss further. \par \par Arturo Ivory M.D.\par Attending Physician,  \par Department of Otolaryngology - Head and Neck Surgery\par [facility] \par Office: [phone]\par Fax: [phone]\par \par

## 2023-03-20 NOTE — HISTORY OF PRESENT ILLNESS
[de-identified] : 55 y/o F with hx of sleep apnea now with b/l nasal congestion L>R x several years. \par pt with chronic rhinitis, states blows her nose a lot. \par pt uses nose strips to help her breathe, has used nasal sprays, and strips for congestion without any relief. \par pt was scheduled for surgery then got cancelled and couldn’t go through with surgery due to pandemic. \par pt has gotten tested for allergies, on allegra-d with relief. \par previous ct shows ethmoid sinus dz \par pt denies sinus pressure, sinus infections \par \par Pt continues to feel L sided congestion and is using mute and nose strips to help with breathing. Pt complains of rhinorrhea and drainage from the nose when she tips her head in the morning.  [FreeTextEntry1] : Patient following up with L sided congestion, rhinorrhea, and drainage from nose especially in the morning. She states she is ready to discuss surgery at this time to improve her breathing.

## 2023-03-20 NOTE — PROCEDURE
[FreeTextEntry6] : Procedure performed: Nasal Endoscopy- Diagnostic\par Pre-op indication(s): nasal congestion\par Post-op indication(s): nasal congestion \par Verbal and/or written consent obtained from patient\par Anterior rhinoscopy insufficient to account for symptoms\par Scope #: 3,  flexible fiber optic telescope \par The scope was introduced in the nasal passage between the middle and inferior turbinates to exam the inferior portion of the middle meatus and the fontanelle, as well as the maxillary ostia.  Next, the scope was passed medically and posteriorly to the middle turbinates to examine the sphenoethmoid recess and the superior turbinate region.\par Upon visualization the finders are as follows:\par Nasal Septum: sigmoidal septal deviation with severe left caudal deflection into the valve,  with nasal valve collapse \par Bilateral - Mucosa: boggy turbinates, Mucous: scant, Polyp: not seen, Inferior Turbinate: boggy, Middle Turbinate: BITH, Superior Turbinate: normal, Inferior Meatus: narrow, Middle Meatus: narrow, Super Meatus:normal, Sphenoethmoidal Recess: clear\par  [de-identified] : \par \par

## 2023-03-20 NOTE — ASSESSMENT
[FreeTextEntry1] : 55 y/o F with hx of sleep apnea, now with nasal congestion. on exam mild septal deviation and bilateral turbinate hypertrophy. Also on exam +mark with nasal valve collapse with severe left caudal deflection - most of issue with breathing \par \par CT scan discussed and reviewed from 9/9/2022. CT impression: no acute sinusitis.\par \par Discussed with pt that she can start on medication management to see if this helps with nasal congestion and rhinorrhea.Discussed with pt surgery intervention to see if this helps with nasal congestion, however, pt appears very anxious about surgery - went over everything at great length.\par \par Risks benefits and alternatives to septonasal reconstruction and bilateral inferior turbinate reduction discussed. Risks of bleeding, infection, septal hematoma, injury to the skull base, septal perforation results in whistling, permanent numbness in and around their nose, pain, discoloration or swelling that may persist, drying scarring crusting and bleeding as well as continued nasal obstruction, empty nose syndrome, cosmetic deformity, uneven-looking nose, collapse, scarring, synechiae, pollybeak deformity, rocker deformity, bone comminution, and need for revisionary surgery (patient explained that there is inherent revision rate to septorhinoplasty), osteitis, bleeding, infection, recurrent or persistent nasal deformity. Patient understood risks and would like to continue with the operation.\par Offered option of cosmetic reconstruction. patient did not want to pay extra for the cosmetic piece and the concern is breathing\par will plan for left  and possible strut \par \par - discussed stopping metformin and ozempic prior to procedure. Advised to speak to anesthesiology as well. \par - discussed post op instructions at length, patient is anxious about breathing at night after procedure with sleep apnea. Questions answered and patient states she understands. \par - patient is motivated and ready for surgery to breath better at this time. \par

## 2023-03-22 ENCOUNTER — OUTPATIENT (OUTPATIENT)
Dept: OUTPATIENT SERVICES | Facility: HOSPITAL | Age: 58
LOS: 1 days | End: 2023-03-22
Payer: MEDICAID

## 2023-03-22 VITALS
RESPIRATION RATE: 17 BRPM | HEART RATE: 67 BPM | OXYGEN SATURATION: 99 % | TEMPERATURE: 99 F | DIASTOLIC BLOOD PRESSURE: 81 MMHG | WEIGHT: 195.99 LBS | HEIGHT: 59 IN | SYSTOLIC BLOOD PRESSURE: 134 MMHG

## 2023-03-22 DIAGNOSIS — Z98.890 OTHER SPECIFIED POSTPROCEDURAL STATES: Chronic | ICD-10-CM

## 2023-03-22 DIAGNOSIS — R09.81 NASAL CONGESTION: ICD-10-CM

## 2023-03-22 DIAGNOSIS — O34.40 MATERNAL CARE FOR OTHER ABNORMALITIES OF CERVIX, UNSPECIFIED TRIMESTER: Chronic | ICD-10-CM

## 2023-03-22 DIAGNOSIS — E66.9 OBESITY, UNSPECIFIED: ICD-10-CM

## 2023-03-22 DIAGNOSIS — E11.9 TYPE 2 DIABETES MELLITUS WITHOUT COMPLICATIONS: ICD-10-CM

## 2023-03-22 DIAGNOSIS — G47.33 OBSTRUCTIVE SLEEP APNEA (ADULT) (PEDIATRIC): ICD-10-CM

## 2023-03-22 DIAGNOSIS — Z01.818 ENCOUNTER FOR OTHER PREPROCEDURAL EXAMINATION: ICD-10-CM

## 2023-03-22 LAB
A1C WITH ESTIMATED AVERAGE GLUCOSE RESULT: 6.2 % — HIGH (ref 4–5.6)
ANION GAP SERPL CALC-SCNC: 11 MMOL/L — SIGNIFICANT CHANGE UP (ref 5–17)
BUN SERPL-MCNC: 6 MG/DL — LOW (ref 7–23)
CALCIUM SERPL-MCNC: 9.8 MG/DL — SIGNIFICANT CHANGE UP (ref 8.4–10.5)
CHLORIDE SERPL-SCNC: 104 MMOL/L — SIGNIFICANT CHANGE UP (ref 96–108)
CO2 SERPL-SCNC: 27 MMOL/L — SIGNIFICANT CHANGE UP (ref 22–31)
CREAT SERPL-MCNC: 0.68 MG/DL — SIGNIFICANT CHANGE UP (ref 0.5–1.3)
EGFR: 102 ML/MIN/1.73M2 — SIGNIFICANT CHANGE UP
ESTIMATED AVERAGE GLUCOSE: 131 MG/DL — HIGH (ref 68–114)
GLUCOSE SERPL-MCNC: 89 MG/DL — SIGNIFICANT CHANGE UP (ref 70–99)
HCT VFR BLD CALC: 39.9 % — SIGNIFICANT CHANGE UP (ref 34.5–45)
HGB BLD-MCNC: 12.7 G/DL — SIGNIFICANT CHANGE UP (ref 11.5–15.5)
MCHC RBC-ENTMCNC: 26.1 PG — LOW (ref 27–34)
MCHC RBC-ENTMCNC: 31.8 GM/DL — LOW (ref 32–36)
MCV RBC AUTO: 82.1 FL — SIGNIFICANT CHANGE UP (ref 80–100)
NRBC # BLD: 0 /100 WBCS — SIGNIFICANT CHANGE UP (ref 0–0)
PLATELET # BLD AUTO: 284 K/UL — SIGNIFICANT CHANGE UP (ref 150–400)
POTASSIUM SERPL-MCNC: 4.4 MMOL/L — SIGNIFICANT CHANGE UP (ref 3.5–5.3)
POTASSIUM SERPL-SCNC: 4.4 MMOL/L — SIGNIFICANT CHANGE UP (ref 3.5–5.3)
RBC # BLD: 4.86 M/UL — SIGNIFICANT CHANGE UP (ref 3.8–5.2)
RBC # FLD: 13.8 % — SIGNIFICANT CHANGE UP (ref 10.3–14.5)
SODIUM SERPL-SCNC: 142 MMOL/L — SIGNIFICANT CHANGE UP (ref 135–145)
WBC # BLD: 5.88 K/UL — SIGNIFICANT CHANGE UP (ref 3.8–10.5)
WBC # FLD AUTO: 5.88 K/UL — SIGNIFICANT CHANGE UP (ref 3.8–10.5)

## 2023-03-22 PROCEDURE — 85027 COMPLETE CBC AUTOMATED: CPT

## 2023-03-22 PROCEDURE — 80048 BASIC METABOLIC PNL TOTAL CA: CPT

## 2023-03-22 PROCEDURE — 83036 HEMOGLOBIN GLYCOSYLATED A1C: CPT

## 2023-03-22 PROCEDURE — G0463: CPT

## 2023-03-22 NOTE — H&P PST ADULT - ASSESSMENT
DASI Score: 5.04  DASI Activity: does not exercise, able to go up one flight of stairs or walk 1-2 blocks with out difficulty, does mild to moderate house chores.  Loose or removable teeth: denies

## 2023-03-22 NOTE — H&P PST ADULT - NSICDXFAMILYHX_GEN_ALL_CORE_FT
FAMILY HISTORY:  Sibling  Still living? Unknown  FH: diabetes mellitus, Age at diagnosis: Age Unknown  FH: heart disease, Age at diagnosis: Age Unknown  FH: HTN (hypertension), Age at diagnosis: Age Unknown    Aunt  Still living? Unknown  FH: HTN (hypertension), Age at diagnosis: Age Unknown  FH: lupus erythematosus, Age at diagnosis: Age Unknown

## 2023-03-22 NOTE — H&P PST ADULT - HISTORY OF PRESENT ILLNESS
57 year female presents to PST' prior to scheduled Septoplasty, Bilateral Inferior Turbinoplasty Valve Repair with Cartilage Graft wit Dr. Cachorro Morris 4/12/23. PMHx of HTN, HLD, DM2, GERD, Obesity (BMI 39.6), GARRET uses nasal strip,  b/l nasal congestion L>R x several years, chronic rhinitis. Pt states she uses nose strips to help her breathe, has used nasal sprays, and strips for congestion without any relief.  Denies any recent trauma to nose. Denies epistaxis, headache, dizziness, N&V, fever, chills.    Patient denies previous Covid19 infection. 57 year female presents to PST' prior to scheduled Septoplasty, Bilateral Inferior Turbinoplasty Valve Repair with Cartilage Graft wit Dr. Cachorro Morris 4/12/23. PMHx of HTN, HLD, DM2, GERD, Obesity (BMI 39.6), GARRET uses nasal strip,  b/l nasal congestion L>R x several years, chronic rhinitis, prolapse turbinate. Pt states she uses nose strips to help her breathe, has used nasal sprays, and strips for congestion without any relief.  Denies any recent trauma to nose. Denies epistaxis, headache, dizziness, N&V, fever, chills.    Patient denies previous Covid19 infection.

## 2023-03-22 NOTE — H&P PST ADULT - PROBLEM SELECTOR PLAN 1
pt scheduled for Septoplasty, Bilateral Inferior Turbinoplasty Valve Repair with Cartilage Graft wit Dr. Cachorro Morris 4/12/23.   Pre-op instructions given, all questions answered.  Labs: CBC, BMP, A1C

## 2023-03-22 NOTE — H&P PST ADULT - NSICDXPASTSURGICALHX_GEN_ALL_CORE_FT
PAST SURGICAL HISTORY:  H/O endoscopy     H/O LEEP (loop electrosurgical excision procedure) of cervix complicating pregnancy     History of colonoscopy     History of D&C

## 2023-03-22 NOTE — H&P PST ADULT - NSICDXPASTMEDICALHX_GEN_ALL_CORE_FT
PAST MEDICAL HISTORY:  Arthritis     GERD (gastroesophageal reflux disease)     HLD (hyperlipidemia)     HTN (hypertension)     Nasal congestion     Obesity     GARRET (obstructive sleep apnea)     Polyp, intestinal     Prediabetes

## 2023-03-31 ENCOUNTER — LABORATORY RESULT (OUTPATIENT)
Age: 58
End: 2023-03-31

## 2023-03-31 ENCOUNTER — APPOINTMENT (OUTPATIENT)
Dept: RHEUMATOLOGY | Facility: CLINIC | Age: 58
End: 2023-03-31
Payer: MEDICAID

## 2023-03-31 VITALS
TEMPERATURE: 97 F | DIASTOLIC BLOOD PRESSURE: 70 MMHG | HEIGHT: 59 IN | SYSTOLIC BLOOD PRESSURE: 110 MMHG | HEART RATE: 96 BPM | BODY MASS INDEX: 39.31 KG/M2 | OXYGEN SATURATION: 98 % | WEIGHT: 195 LBS

## 2023-03-31 DIAGNOSIS — E66.9 OBESITY, UNSPECIFIED: ICD-10-CM

## 2023-03-31 DIAGNOSIS — D68.61 ANTIPHOSPHOLIPID SYNDROME: ICD-10-CM

## 2023-03-31 PROCEDURE — 99214 OFFICE O/P EST MOD 30 MIN: CPT

## 2023-03-31 RX ORDER — LISINOPRIL 30 MG/1
TABLET ORAL
Refills: 0 | Status: DISCONTINUED | COMMUNITY
End: 2023-03-31

## 2023-04-01 PROBLEM — M19.90 UNSPECIFIED OSTEOARTHRITIS, UNSPECIFIED SITE: Chronic | Status: ACTIVE | Noted: 2023-03-22

## 2023-04-01 PROBLEM — R09.81 NASAL CONGESTION: Chronic | Status: ACTIVE | Noted: 2023-03-22

## 2023-04-01 PROBLEM — G47.33 OBSTRUCTIVE SLEEP APNEA (ADULT) (PEDIATRIC): Chronic | Status: ACTIVE | Noted: 2023-03-22

## 2023-04-01 PROBLEM — K21.9 GASTRO-ESOPHAGEAL REFLUX DISEASE WITHOUT ESOPHAGITIS: Chronic | Status: ACTIVE | Noted: 2023-03-22

## 2023-04-01 PROBLEM — E78.5 HYPERLIPIDEMIA, UNSPECIFIED: Chronic | Status: ACTIVE | Noted: 2023-03-22

## 2023-04-01 PROBLEM — E66.9 OBESITY, UNSPECIFIED: Chronic | Status: ACTIVE | Noted: 2023-03-22

## 2023-04-01 PROBLEM — K63.5 POLYP OF COLON: Chronic | Status: ACTIVE | Noted: 2023-03-22

## 2023-04-02 RX ORDER — HYOSCYAMINE SULFATE 0.12 MG/1
0.12 TABLET SUBLINGUAL 3 TIMES DAILY
Qty: 90 | Refills: 2 | Status: DISCONTINUED | COMMUNITY
Start: 2020-03-10 | End: 2023-04-02

## 2023-04-02 RX ORDER — AZELASTINE HYDROCHLORIDE 137 UG/1
SPRAY, METERED NASAL
Refills: 0 | Status: DISCONTINUED | COMMUNITY
End: 2023-04-02

## 2023-04-02 RX ORDER — IPRATROPIUM BROMIDE 21 UG/1
0.03 SPRAY NASAL 3 TIMES DAILY
Qty: 1 | Refills: 5 | Status: DISCONTINUED | COMMUNITY
Start: 2020-12-14 | End: 2023-04-02

## 2023-04-02 RX ORDER — DIPHENHYDRAMINE HCL 25 MG/1
25 TABLET ORAL EVERY 4 HOURS
Qty: 120 | Refills: 0 | Status: DISCONTINUED | COMMUNITY
Start: 2020-11-09 | End: 2023-04-02

## 2023-04-02 RX ORDER — DICLOFENAC SODIUM 1% 10 MG/G
1 GEL TOPICAL
Qty: 300 | Refills: 1 | Status: DISCONTINUED | COMMUNITY
Start: 2020-12-22 | End: 2023-04-02

## 2023-04-02 RX ORDER — OMEPRAZOLE 40 MG/1
40 CAPSULE, DELAYED RELEASE ORAL
Qty: 90 | Refills: 1 | Status: DISCONTINUED | COMMUNITY
Start: 2020-12-22 | End: 2023-04-02

## 2023-04-02 RX ORDER — BUPROPION HYDROCHLORIDE 75 MG/1
75 TABLET, FILM COATED ORAL
Refills: 0 | Status: DISCONTINUED | COMMUNITY
End: 2023-04-02

## 2023-04-02 NOTE — PHYSICAL EXAM
[General Appearance - Alert] : alert [General Appearance - In No Acute Distress] : in no acute distress [General Appearance - Well Developed] : well developed [General Appearance - Well-Appearing] : healthy appearing [Respiration, Rhythm And Depth] : normal respiratory rhythm and effort [Exaggerated Use Of Accessory Muscles For Inspiration] : no accessory muscle use [Auscultation Breath Sounds / Voice Sounds] : lungs were clear to auscultation bilaterally [Heart Rate And Rhythm] : heart rate was normal and rhythm regular [Heart Sounds] : normal S1 and S2 [Heart Sounds Gallop] : no gallops [Murmurs] : no murmurs [Heart Sounds Pericardial Friction Rub] : no pericardial rub [Edema] : there was no peripheral edema [Cervical Lymph Nodes Enlarged Posterior Bilaterally] : posterior cervical [Cervical Lymph Nodes Enlarged Anterior Bilaterally] : anterior cervical [Supraclavicular Lymph Nodes Enlarged Bilaterally] : supraclavicular [No CVA Tenderness] : no ~M costovertebral angle tenderness [No Spinal Tenderness] : no spinal tenderness [Abnormal Walk] : normal gait [Nail Clubbing] : no clubbing  or cyanosis of the fingernails [Musculoskeletal - Swelling] : no joint swelling seen [Motor Tone] : muscle strength and tone were normal [Skin Color & Pigmentation] : normal skin color and pigmentation [Skin Turgor] : normal skin turgor [] : no rash [No Focal Deficits] : no focal deficits [Oriented To Time, Place, And Person] : oriented to person, place, and time [Impaired Insight] : insight and judgment were intact [Affect] : the affect was normal [Sclera] : the sclera and conjunctiva were normal [PERRL With Normal Accommodation] : pupils were equal in size, round, and reactive to light [Extraocular Movements] : extraocular movements were intact [Outer Ear] : the ears and nose were normal in appearance [Oropharynx] : the oropharynx was normal [Neck Appearance] : the appearance of the neck was normal [Neck Cervical Mass (___cm)] : no neck mass was observed [Jugular Venous Distention Increased] : there was no jugular-venous distention [Thyroid Diffuse Enlargement] : the thyroid was not enlarged [Thyroid Nodule] : there were no palpable thyroid nodules [FreeTextEntry1] : fast speech but relaxed

## 2023-04-02 NOTE — REVIEW OF SYSTEMS
[Heartburn] : heartburn [Arthralgias] : arthralgias [Sleep Disturbances] : sleep disturbances [Negative] : Heme/Lymph [Cough] : cough [Joint Pain] : joint pain [Skin Lesions] : no skin lesions [FreeTextEntry2] : doing better since her last visit w/improved sleep [FreeTextEntry4] : closed nostril, scheduled for deviated septum repair  [FreeTextEntry8] : vaginal dryness  [FreeTextEntry6] : post nasal drip- overall has been better till recently  [FreeTextEntry9] : bl thumb pain, arthritic back pain, and L knee pain intermittently due to previous meniscal tear  [de-identified] : no infections, well healed  [de-identified] : off all psych medication since improvement in sleep; night sweats still disrupts sleep  [de-identified] : T 2 L sided post herpetic neuralgia resolved w/ scarring but no further pain

## 2023-04-02 NOTE — HISTORY OF PRESENT ILLNESS
[FreeTextEntry1] : 3/31/2023\par Last seen 2020\par hasn't felt this good since her 40s \par \par - was previously pre-diabetic now diabetic on Metformin 500 mg BID.....also taking Ozempic .....wt has been fluctuating by 20 lbs up and down since her last visit....now down 21 lbs since 2022\par - goal is to lose another 50 lbs\par \par - during her last visit she was having severe body pain all over.....she changed her diet (reduced carbs and sugar) and her pain complete resolved, except for L knee, back, and hand pain\par - has been having back pain for over a year before presenting to ortho.......imaging showed arthritis\par - c/o pain and stiffness in her bl thumbs R>L....has been experiencing locking in her R thumb intermittently\par \par - no longer on medication for her mood since her sleep improved.. and overall doing fairly well \par - previously wore mouth guard for sleep apnea but d/c because she could not tolerate it due to excessive coughing from her BP medication at the time\par     - was taking lisinopril and it caused severe coughing  to the point of vomiting.....these symptoms persisted for \par      almost a 1.5 years before they realized it was her BP medication....when she d/c experienced relief within a      week....now on Losartan 25 mg  \par - sleep has improved w/nose strips and a device called mute (opens nostrils)......she feels great w/improved sleep\par " I haven't felt this great since I was 41"\par \par - ROS neg except for vaginal dryness, dry cough, postnasal drip, and sleep disturbance (night sweats)\par \par - scheduled for a deviated septum repair next month to open closed nostril \par \par - denies ever having COVID\par - ROS:  denies fevers, chills, night sweats, lymphadenopathy,, arthropathy, rash, alopecia, skin thickening, raynauds, headaches, vision loss, dry eyes, photophobia or redness, no sicca, mucositis, nasal/ sinus congestion,  serositis, cp, sob, cough, GERD, n/v/d/c or abd bloating, dysuria, no  bleeding/ clotting dyscrasias or cytopenias, paresthesias or weakness, renal or hepatic dysfunction.\par \par \par \par \par  \par 1) POsitive EV/ Chronic pain (FM):  1:160 H. 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  \par \par 4) GERD/ hiatal hernia cannot miss PPI.  Ranitidine not helpful, tums (hasn't tried carafate) GI, Dr. 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  Associated w/ stiffness most significant inflammatory back sx L > R  30-60 mins but all other joints also stiff.  Usually on consistent for past year- no overt swelling in any joints-  Using Aleve 2-4 tabs daily w/ nearly complete relief. \par Hypersensitivity to touch\par Migraines in past,  3 elective ab, 1 1st trimester, no DVT, PE, MI, CVA\par Recent Cardiac stress test for long term H2/ PPI and obesity 2016 \par NO personal or family hx of psoriais, IBD, inflammatory eye dz, AS \par Long hx LBP- localized so severe can't walk, at times can't tolerate more than 15-30 mins.  Intermittent episodes of radiculopathy only on L side (steroids required in past) \par Heel pain:  b/l L worse than R, xrays suggest osteoporosis.  MRI pending \par w/ significant hot flashes for past year- periods irreg- LMP 6 mnths ago\par Significant issue w/ dyscognition\par Lives with GERD/ hiatal hernia cannot miss PPI.  Ranitidine not helpful, tums (hasn't tried carafate) GI, Dr. Lopez otherwise no significant GI issues-\par Bladder:  IC overactive, incontinence doing well on oxyybutin- does have severe dry mouth with this, and exacerbates asthma.\par ROS:  no significant fatigue, no fevers, lymphandenopathy, dry mouth drug related, no dry eyes; serositis, thinning in male pattern, mild intermittent cough r/t asthma.  no renal/ liver dysfunction\par + Lyme and HPV in past   \par \par Asthma:  fairly well controlled on daily H2- allegra D daily- nothing else, poor tolerance in past.  Rare need for inhalers.  \par NOTE:  doesn't tolerate steroids well- everything feels miserable with change in mental status- fogginess and altered personality, migraines in past  \par \par Bone Health \par FH:  + maternal aunts w/ RA \par \par

## 2023-04-02 NOTE — ASSESSMENT
[FreeTextEntry1] : 58yo perimenopausal obese wf w/ HLD, mild HTN, hx shingles/ and erhlichiosis (both tx), + LAC w/ intermittently low level ACL/ B2G... 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 w/ + LAC, + dsDNA low 35, nl C3/4 and high titer B2G IgM > 150. UPdated labs ordered now.  Not seen for more than 2 ys.. but still neg s/s of SLE, no bleeding/ clotting dyscrasias. Overall feeling better.  \par APS: continues to have no sx of bleeding/ clotting dyscrasia or cytopenias otherwise. Minimal c/o HA.. overall doing very well- in past presented w/ asymmetrical pauciarticular joint pain/ swelling. Nothing for past few ys\par No active/ recent inflammatory joint sx..  \par 2016  + LAC w/ B2G IGM > 150, ACL IGM + and PTT slightly elevated w/ neg PTS\par Dec  2016  - LAC w/ neg ACL, nl PTT and neg PTS. \par Mar  2016  - LAC, + ACL IGM 24, B2G IGM > 150, neg PTS, nl PTT... \par 2019  - LAC (DRVVT) / + silica w/ low + ACL IgM nl PTT w/ all other studies neg/ nl \par 2020   +LAC, (DRVVT), B2G IGM > 150, ACL IGM 28,  phosphatidyl IGM 89, w/ neg PS/PT antibodies,  dsDNA 44, high C3/4, ESR 64\par NOTE: \par  severe bleeding throughout, 1 miscarriage 1st trimester and 3 elective ab...no other cytopenias/ bleeding/ clotting dyscrasias... \par No personal or FH psoriasis, AS, inflammatory back, bowel or eye dz\par - doesn't tolerate steroids well- everything feels miserable with change in mental status- fogginess and altered personality\par \par 2) Chronic pain, mild FM: diffuse for many ys managed well controlled at this point though fatigue persists.  Off all NSAIDs and psychotropic agents... 2/2 HTN...Overall feeling better... .. topical NSAIDs + response will continue \par OVerall doing well past yr, does note some increase in muscle cramping severe day/ night.. no tx to date\par - Sleep: confirmed mod GARRET w/ long episodes as well as severe  sleep latency up to 2-3 hs and then only 4-6 hs disrupted 2-4 x wakes..  Trial CPAP not tolerated, working on getting oral appliance.  Excellent response to gabapentin - everything better low dose but didn't continue 2/2 HTN.and wt gain.. . Still hasn't tried melatonin\par - Migraines fully resolved \par - CP:  nothing recently mild recent cardiac stress test for long term mild - negative,  H2/ PPI w/ + response  \par - Dyscognition:  Significant issue in past doing well now \par - Bladder:  IC overactive, incontinence doing well on oxyybutin but stopped 2/2 concerns HTN stable, tolerable \par - paresthesias/ post herpetic neuralgia L sided T 2 dermatome:  migratory, mild..NOW resolved\par - IBS:  D/ C controlled w/ prn hyoscyamine \par - Long hx LBP- overall much improved... more active now.. No recent radiculopathy.. \par NOTE: \par - flexeril/ tizanidine not tolerated\par -  stopped gabapentin 2/2 concerns fluid retention/ and ? HTN.. \par \par 3) GERD/ hiatal hernia cannot miss PPI.  Ranitidine not helpful, tums (hasn't tried carafate) GI, Dr. Lopez otherwise no significant GI issues-sulcrafate now PRN\par \par 4) Perimenopausal:  Perimenopausal sx severe at times w/ Dexa  nl mild intermittent sx now\par \par 5) Obesity:  BMI 38- now 39- 41->43-> 39  with progressive increase over past few ys.  Now with\par - HTN:  well controlled now.. .but needs to minimize NSAIDs still \par - HLD:  noted mild ... will need to monitor\par - T2DM:  HbA1c 6.3 ... needs careful monitoring here too\par \par \par Plan:\par \par - complete labs \par \par - use Voltaren gel for hand pain 4 times daily\par \par - RTO in 6 mnths to 1 year \par \par \par \par - continue prn carafate (sulcrafate) at least twice daily at dinner and bedtime...\par \par - Continue wellbutrin 75 mg once daily \par \par - try to minimize sodium intake (salty food.. don't add salt to food)... \par \par - go to a Qgiv \par \par - still need to work on wt loss.. this will help decrease snoring, sleep apnea, joint pain and back pain.  \par \par - still need to f/u w/ pulmonary regarding sleep study \par \par - rto 3-6  months \par \par

## 2023-04-04 LAB
ALBUMIN SERPL ELPH-MCNC: 4.9 G/DL
ALP BLD-CCNC: 107 U/L
ALT SERPL-CCNC: 21 U/L
ANION GAP SERPL CALC-SCNC: 15 MMOL/L
APPEARANCE: CLEAR
AST SERPL-CCNC: 17 U/L
BASOPHILS # BLD AUTO: 0.03 K/UL
BASOPHILS NFR BLD AUTO: 0.4 %
BILIRUB SERPL-MCNC: 0.2 MG/DL
BILIRUBIN URINE: NEGATIVE
BLOOD URINE: NEGATIVE
BUN SERPL-MCNC: 11 MG/DL
CALCIUM SERPL-MCNC: 10.2 MG/DL
CHLORIDE SERPL-SCNC: 101 MMOL/L
CO2 SERPL-SCNC: 24 MMOL/L
COLOR: YELLOW
CREAT SERPL-MCNC: 0.78 MG/DL
CRP SERPL-MCNC: 4 MG/L
EGFR: 89 ML/MIN/1.73M2
EOSINOPHIL # BLD AUTO: 0.06 K/UL
EOSINOPHIL NFR BLD AUTO: 0.8 %
ERYTHROCYTE [SEDIMENTATION RATE] IN BLOOD BY WESTERGREN METHOD: 36 MM/HR
GLUCOSE QUALITATIVE U: NEGATIVE
GLUCOSE SERPL-MCNC: 86 MG/DL
HCT VFR BLD CALC: 42.7 %
HGB BLD-MCNC: 13.8 G/DL
IMM GRANULOCYTES NFR BLD AUTO: 0.3 %
KETONES URINE: NEGATIVE
LEUKOCYTE ESTERASE URINE: ABNORMAL
LYMPHOCYTES # BLD AUTO: 2.13 K/UL
LYMPHOCYTES NFR BLD AUTO: 29.1 %
MAN DIFF?: NORMAL
MCHC RBC-ENTMCNC: 26.4 PG
MCHC RBC-ENTMCNC: 32.3 GM/DL
MCV RBC AUTO: 81.8 FL
MONOCYTES # BLD AUTO: 0.54 K/UL
MONOCYTES NFR BLD AUTO: 7.4 %
NEUTROPHILS # BLD AUTO: 4.55 K/UL
NEUTROPHILS NFR BLD AUTO: 62 %
NITRITE URINE: NEGATIVE
PH URINE: 6
PLATELET # BLD AUTO: 324 K/UL
POTASSIUM SERPL-SCNC: 4.4 MMOL/L
PROT SERPL-MCNC: 7.5 G/DL
PROTEIN URINE: NORMAL
RBC # BLD: 5.22 M/UL
RBC # FLD: 14.1 %
SODIUM SERPL-SCNC: 140 MMOL/L
SPECIFIC GRAVITY URINE: 1.03
THYROGLOB AB SERPL-ACNC: <20 IU/ML
THYROPEROXIDASE AB SERPL IA-ACNC: 26.7 IU/ML
TSH SERPL-ACNC: 1.76 UIU/ML
UROBILINOGEN URINE: NORMAL
WBC # FLD AUTO: 7.33 K/UL

## 2023-04-05 ENCOUNTER — NON-APPOINTMENT (OUTPATIENT)
Age: 58
End: 2023-04-05

## 2023-04-07 ENCOUNTER — NON-APPOINTMENT (OUTPATIENT)
Age: 58
End: 2023-04-07

## 2023-04-10 ENCOUNTER — NON-APPOINTMENT (OUTPATIENT)
Age: 58
End: 2023-04-10

## 2023-04-11 ENCOUNTER — TRANSCRIPTION ENCOUNTER (OUTPATIENT)
Age: 58
End: 2023-04-11

## 2023-04-12 ENCOUNTER — OUTPATIENT (OUTPATIENT)
Dept: OUTPATIENT SERVICES | Facility: HOSPITAL | Age: 58
LOS: 1 days | End: 2023-04-12
Payer: MEDICAID

## 2023-04-12 ENCOUNTER — RESULT REVIEW (OUTPATIENT)
Age: 58
End: 2023-04-12

## 2023-04-12 ENCOUNTER — APPOINTMENT (OUTPATIENT)
Dept: OTOLARYNGOLOGY | Facility: HOSPITAL | Age: 58
End: 2023-04-12

## 2023-04-12 ENCOUNTER — TRANSCRIPTION ENCOUNTER (OUTPATIENT)
Age: 58
End: 2023-04-12

## 2023-04-12 VITALS
RESPIRATION RATE: 18 BRPM | HEART RATE: 72 BPM | SYSTOLIC BLOOD PRESSURE: 148 MMHG | OXYGEN SATURATION: 100 % | DIASTOLIC BLOOD PRESSURE: 62 MMHG

## 2023-04-12 VITALS
OXYGEN SATURATION: 98 % | TEMPERATURE: 99 F | HEIGHT: 59 IN | DIASTOLIC BLOOD PRESSURE: 72 MMHG | SYSTOLIC BLOOD PRESSURE: 155 MMHG | HEART RATE: 78 BPM | WEIGHT: 195.99 LBS | RESPIRATION RATE: 18 BRPM

## 2023-04-12 DIAGNOSIS — Z98.890 OTHER SPECIFIED POSTPROCEDURAL STATES: Chronic | ICD-10-CM

## 2023-04-12 DIAGNOSIS — O34.40 MATERNAL CARE FOR OTHER ABNORMALITIES OF CERVIX, UNSPECIFIED TRIMESTER: Chronic | ICD-10-CM

## 2023-04-12 DIAGNOSIS — R05.3 CHRONIC COUGH: ICD-10-CM

## 2023-04-12 DIAGNOSIS — R09.81 NASAL CONGESTION: ICD-10-CM

## 2023-04-12 LAB
GLUCOSE BLDC GLUCOMTR-MCNC: 110 MG/DL — HIGH (ref 70–99)
GLUCOSE BLDC GLUCOMTR-MCNC: 96 MG/DL — SIGNIFICANT CHANGE UP (ref 70–99)

## 2023-04-12 PROCEDURE — 30520 REPAIR OF NASAL SEPTUM: CPT

## 2023-04-12 PROCEDURE — 30140 RESECT INFERIOR TURBINATE: CPT

## 2023-04-12 PROCEDURE — C9399: CPT

## 2023-04-12 PROCEDURE — 88300 SURGICAL PATH GROSS: CPT

## 2023-04-12 PROCEDURE — 30140 RESECT INFERIOR TURBINATE: CPT | Mod: 50

## 2023-04-12 PROCEDURE — 20912 REMOVE CARTILAGE FOR GRAFT: CPT

## 2023-04-12 PROCEDURE — 82962 GLUCOSE BLOOD TEST: CPT

## 2023-04-12 PROCEDURE — 30465 REPAIR NASAL STENOSIS: CPT

## 2023-04-12 PROCEDURE — 88300 SURGICAL PATH GROSS: CPT | Mod: 26

## 2023-04-12 RX ORDER — LOSARTAN POTASSIUM 100 MG/1
1 TABLET, FILM COATED ORAL
Qty: 0 | Refills: 0 | DISCHARGE

## 2023-04-12 RX ORDER — HYDROMORPHONE HYDROCHLORIDE 2 MG/ML
0.5 INJECTION INTRAMUSCULAR; INTRAVENOUS; SUBCUTANEOUS
Refills: 0 | Status: DISCONTINUED | OUTPATIENT
Start: 2023-04-12 | End: 2023-04-12

## 2023-04-12 RX ORDER — LIDOCAINE HCL 20 MG/ML
0.2 VIAL (ML) INJECTION ONCE
Refills: 0 | Status: DISCONTINUED | OUTPATIENT
Start: 2023-04-12 | End: 2023-04-12

## 2023-04-12 RX ORDER — BENZOCAINE AND MENTHOL 5; 1 G/100ML; G/100ML
1 LIQUID ORAL ONCE
Refills: 0 | Status: COMPLETED | OUTPATIENT
Start: 2023-04-12 | End: 2023-04-12

## 2023-04-12 RX ORDER — MULTIVIT-MIN/FERROUS GLUCONATE 9 MG/15 ML
1 LIQUID (ML) ORAL
Qty: 0 | Refills: 0 | DISCHARGE

## 2023-04-12 RX ORDER — METFORMIN HYDROCHLORIDE 850 MG/1
1 TABLET ORAL
Qty: 0 | Refills: 0 | DISCHARGE

## 2023-04-12 RX ORDER — ONDANSETRON 8 MG/1
4 TABLET, FILM COATED ORAL ONCE
Refills: 0 | Status: DISCONTINUED | OUTPATIENT
Start: 2023-04-12 | End: 2023-04-12

## 2023-04-12 RX ORDER — SEMAGLUTIDE 0.68 MG/ML
0 INJECTION, SOLUTION SUBCUTANEOUS
Qty: 0 | Refills: 0 | DISCHARGE

## 2023-04-12 RX ORDER — OMEPRAZOLE 10 MG/1
1 CAPSULE, DELAYED RELEASE ORAL
Qty: 0 | Refills: 0 | DISCHARGE

## 2023-04-12 RX ORDER — SODIUM CHLORIDE 9 MG/ML
3 INJECTION INTRAMUSCULAR; INTRAVENOUS; SUBCUTANEOUS EVERY 8 HOURS
Refills: 0 | Status: DISCONTINUED | OUTPATIENT
Start: 2023-04-12 | End: 2023-04-12

## 2023-04-12 RX ORDER — SODIUM CHLORIDE 9 MG/ML
1000 INJECTION INTRAMUSCULAR; INTRAVENOUS; SUBCUTANEOUS
Refills: 0 | Status: DISCONTINUED | OUTPATIENT
Start: 2023-04-12 | End: 2023-04-26

## 2023-04-12 RX ORDER — ATORVASTATIN CALCIUM 80 MG/1
1 TABLET, FILM COATED ORAL
Qty: 0 | Refills: 0 | DISCHARGE

## 2023-04-12 RX ADMIN — BENZOCAINE AND MENTHOL 1 LOZENGE: 5; 1 LIQUID ORAL at 10:26

## 2023-04-12 NOTE — ASU DISCHARGE PLAN (ADULT/PEDIATRIC) - ASU DC SPECIAL INSTRUCTIONSFT
- No nose blowing for 2 weeks, cough/sneeze through an open mouth   - No straining for two weeks  - Complete antibiotics as prescribed  - Pain medication as needed - do not drive, make decisions or operate machinery on pain meds. if constipates take stool softener, do not strain.   - start using nasal wash tomorrow, 2-3 times a day if possible  - follow up with Dr. Ivory in 1-2 weeks  - expect some bloody oozing from the nose for the first week, put a gauze under to catch it. May use afrin if needed

## 2023-04-12 NOTE — ASU DISCHARGE PLAN (ADULT/PEDIATRIC) - CARE PROVIDER_API CALL
Arturo Ivory (MD)  Otolaryngology  [address] [place] [address], [facility] [address]  [place] [address]  Phone: [phone]  Fax: [phone]  Follow Up Time:

## 2023-04-12 NOTE — ASU DISCHARGE PLAN (ADULT/PEDIATRIC) - NS MD DC FALL RISK RISK
For information on Fall & Injury Prevention, visit: [email or web address] OR  [email or web address] OR  https://www.cdc.gov/steadi/patient.html

## 2023-04-12 NOTE — PRE-ANESTHESIA EVALUATION ADULT - NSANTHPMHFT_GEN_ALL_CORE
chart and consultant notes reviewed. no hx sig cv/pulm dz - states et > 1 flight, no cp/sob. ekg noted. edith, non-compliant. dm fs < 200

## 2023-04-12 NOTE — ASU DISCHARGE PLAN (ADULT/PEDIATRIC) - PATIENT EDUCATION MATERIALS PROVIED
[FreeTextEntry1] : CAD: continue DAPT and aggressive risk factor modification \par \par HTN: Increase Lisinopril to 10mg po daily and change metoprolol to carvedilol. \par \par Ischemic CM: no symptoms, changes as above and repeat TTE  Provider pre-printed instructions given

## 2023-04-14 ENCOUNTER — APPOINTMENT (OUTPATIENT)
Dept: OTOLARYNGOLOGY | Facility: CLINIC | Age: 58
End: 2023-04-14
Payer: MEDICAID

## 2023-04-14 VITALS — HEIGHT: 59 IN | WEIGHT: 195 LBS | BODY MASS INDEX: 39.31 KG/M2 | TEMPERATURE: 97.2 F

## 2023-04-14 PROCEDURE — 31237 NSL/SINS NDSC SURG BX POLYPC: CPT | Mod: 50,58

## 2023-04-14 PROCEDURE — 99024 POSTOP FOLLOW-UP VISIT: CPT

## 2023-04-14 NOTE — ASSESSMENT
[FreeTextEntry1] : 57 y/o F with hx of sleep apnea, now with nasal congestion. on exam mild septal deviation and bilateral turbinate hypertrophy. Also on exam +mark with nasal valve collapse with severe left caudal deflection - most of issue with breathing \par \par Pt now following up s/p septo turbs nasal valve repair with graft 4/12/23. The patient was debrided bilaterally with rigid suction as a result of nasal crusting. breathing much improved after detriment. Packing introduced into L nose to see if will slow down oozing\par Patient should continue to avoid nose blowing, heavy lifting or exercising, and should start a regimen of over the counter nasal saline spray for moisturization of the nasal cavities\par will follow up to assure no scarring and keep sinuses open. pt recovering well\par \par

## 2023-04-14 NOTE — HISTORY OF PRESENT ILLNESS
[de-identified] : 56 y/o F pt s/p septo turbs nasal valve repair with graft 4/12/23. Pt following up and reports congestion and bleeding. Still with splints as its only been 2 days post-op. Pt reports coughing and blood dripping down. Otherwise, recovering well

## 2023-04-14 NOTE — REASON FOR VISIT
[Post-Operative Visit] : a post-operative visit [FreeTextEntry2] : septo turbs nasal valve repair with graft 4/12/23

## 2023-04-14 NOTE — CONSULT LETTER
[Please see my note below.] : Please see my note below. [FreeTextEntry1] : Dear Dr. EZEKIEL CABELLO \par I had the pleasure of evaluating your patient ALE BECK, thank you for allowing us to participate in their care. please see full note detailing our visit below.\par If you have any questions, please do not hesitate to call me and I would be happy to discuss further. \par \par Arturo Ivory M.D.\par Attending Physician,  \par Department of Otolaryngology - Head and Neck Surgery\par [facility] \par Office: [phone]\par Fax: [phone]\par \par

## 2023-04-14 NOTE — PROCEDURE
[FreeTextEntry6] : procedure - bilateral endoscopic nasal  debridement\par Dx - crusting/ polypoid tissue \par Verbal consent obtained - discussed risks and benefits of intervention before proceeding \par Bilateral nasal cavities inspected with #0 ridged sinus endoscope. septum appeared midline and turbinates well reduced. bilateral middle meatuses were explored and suction and agitator were used to open ostiums and open any forming scar bands. all sinuses were explored and open at end of procedure\par packing introduced into L nose - nasport lateral to the splint  [de-identified] : \par \par

## 2023-04-20 ENCOUNTER — APPOINTMENT (OUTPATIENT)
Dept: OTOLARYNGOLOGY | Facility: CLINIC | Age: 58
End: 2023-04-20
Payer: MEDICAID

## 2023-04-20 VITALS
HEIGHT: 59 IN | BODY MASS INDEX: 39.31 KG/M2 | WEIGHT: 195 LBS | TEMPERATURE: 97.3 F | HEART RATE: 84 BPM | DIASTOLIC BLOOD PRESSURE: 73 MMHG | SYSTOLIC BLOOD PRESSURE: 130 MMHG

## 2023-04-20 PROCEDURE — 99024 POSTOP FOLLOW-UP VISIT: CPT

## 2023-04-20 PROCEDURE — 31237 NSL/SINS NDSC SURG BX POLYPC: CPT | Mod: 50,58

## 2023-04-20 NOTE — REVIEW OF SYSTEMS
[Seasonal Allergies] : seasonal allergies [Post Nasal Drip] : post nasal drip [As Noted in HPI] : as noted in HPI [Nasal Congestion] : nasal congestion [Discolored Nasal Discharge] : discolored nasal discharge [Snoring With Pauses] : snoring with pauses [Negative] : Heme/Lymph [FreeTextEntry1] : sleep apnea

## 2023-04-20 NOTE — CONSULT LETTER
[Please see my note below.] : Please see my note below. [FreeTextEntry1] : Dear Dr. EZEKIEL CABELLO \par I had the pleasure of evaluating your patient ALE BECK, thank you for allowing us to participate in their care. please see full note detailing our visit below.\par If you have any questions, please do not hesitate to call me and I would be happy to discuss further. \par \par Arturo Ivory M.D.\par Attending Physician,  \par Department of Otolaryngology - Head and Neck Surgery\par [facility] \par Office: [phone]\par Fax: [phone]\par \par

## 2023-04-20 NOTE — END OF VISIT
[FreeTextEntry3] : I personally saw and examined  the patient in detail.  I spoke to YAMEL Rapp regarding the assessment and plan of care. I performed the procedures and relevant physical exam.  I have reviewed the above assessment and plan of care and I agree.  I have made changes to the body of the note wherever necessary and appropriate

## 2023-04-20 NOTE — PROCEDURE
[FreeTextEntry6] : procedure - bilateral endoscopic nasal  debridement\par Dx - crusting/ polypoid tissue \par Verbal consent obtained - discussed risks and benefits of intervention before proceeding \par Bilateral nasal cavities inspected with #0 ridged sinus endoscope. septum appeared midline and turbinates well reduced. bilateral middle meatuses were explored and suction and agitator were used to open ostiums and open any forming scar bands. all sinuses were explored and open at end of procedure\par packing introduced into L nose - nasport lateral to the splint  [de-identified] : \par \par

## 2023-04-20 NOTE — ASSESSMENT
[FreeTextEntry1] : 55 y/o F with hx of sleep apnea, now with nasal congestion.On exam mild septal deviation and bilateral turbinate hypertrophy. Also on exam +mark with nasal valve collapse with severe left caudal deflection - most of issue with breathing \par Pt now following up s/p septo turbs nasal valve repair with graft 4/12/23. patient follows up 1 week status post ESS, septoplasty with turbs. splints were removed and the patient was debrided bilaterally with rigid suction as a result of nasal crusting. breathing much improved after detriment. Patient should continue to avoid nose blowing, heavy lifting or exercising, and should start a regimen of over the counter nasal saline spray for moisturization of the nasal cavities\par will follow up to assure no scarring and keep sinuses open\par happy with result, left  and strut with swing of caudal septum

## 2023-04-20 NOTE — HISTORY OF PRESENT ILLNESS
[de-identified] : 56 y/o F pt s/p septo turbs nasal valve repair with graft 4/12/23. Pt following up and reports congestion and face soreness. States as day goes on, her palate hurts and inside of nose is tender since procedure. Patient is with difficulty with sleep due to pressure and congestion but otherwise recovering well. Bleeding has subsided. Patient is not using rinses.

## 2023-04-21 LAB
ANTI-BETA2 GLYCOPROTEIN 1 IGG CONCENTRATION: 4 U/ML
ANTI-BETA2 GLYCOPROTEIN 1 IGM CONCENTRATION: >576 U/ML
ANTI-CARDIOLIPIN IGG CONCENTRATION: 3 GPL
ANTI-CARDIOLIPIN IGM CONCENTRATION: 5 MPL
ANTI-CENP IGG CONCENTRATION: 1 U/ML
ANTI-CYCLIC CITRULLINATED PEPTIDE IGG CONCENTRATION: 2 U/ML
ANTI-DOUBLE-STRANDED DNA IGG CONCENTRATION: 156 IU/ML
ANTI-JO-1 IGG CONCENTRATION: <0.3 U/ML
ANTI-NUCLEAR ANTIBODIES - CYTOPLASMIC PATTERN: NORMAL
ANTI-NUCLEAR ANTIBODIES - PRIMARY NUCLEAR PATTERN: NORMAL
ANTI-NUCLEAR ANTIBODIES - PRIMARY PATTERN TITER: NEGATIVE
ANTI-NUCLEAR ANTIBODIES IGG CONCENTRATION: 5 UNITS
ANTI-RNA POL III IGG CONCENTRATION: <0.7 U/ML
ANTI-RNP70 IGG CONCENTRATION: 4 U/ML
ANTI-RO52 IGG CONCENTRATION: 1 U/ML
ANTI-RO60 IGG CONCENTRATION: 1 U/ML
ANTI-SCL-70 IGG CONCENTRATION: 1 U/ML
ANTI-SMITH IGG CONCENTRATION: 1 U/ML
ANTI-SS-B (LA) IGG CONCENTRATION: 1 U/ML
ANTI-THYROGLOBULIN IGG CONCENTRATION: 25 IU/ML
ANTI-THYROID PEROXIDASE IGG CONCENTRATION: 5 IU/ML
ANTI-U1RNP IGG CONCENTRATION: 4 U/ML
AVISE LUPUS RESULT: NORMAL
B-LYMPHOCYTE-BOUND C4D (BC4D) LEVEL: NORMAL
ERYTHROCYTE-BOUND C4D (EC4D) LEVEL: 3
MYELOPEROXIDASE AB SER QL IA: <5 UNITS
MYELOPEROXIDASE CELLS FLD QL: NEGATIVE
PROTEINASE3 AB SER IA-ACNC: <5 UNITS
PROTEINASE3 AB SER-ACNC: NEGATIVE
PS IGA SER QL: 2
PS IGG SER QL: <9 UNITS
PS IGM SER QL: 24 UNITS
RHEUMATOID FACTOR (IGA) CONCENTRATION: 3 IU/ML
RHEUMATOID FACTOR (IGM) CONCENTRATION: 3 IU/ML

## 2023-05-01 LAB — SURGICAL PATHOLOGY STUDY: SIGNIFICANT CHANGE UP

## 2023-05-12 ENCOUNTER — APPOINTMENT (OUTPATIENT)
Dept: OTOLARYNGOLOGY | Facility: CLINIC | Age: 58
End: 2023-05-12
Payer: MEDICAID

## 2023-05-12 VITALS — BODY MASS INDEX: 39.31 KG/M2 | WEIGHT: 195 LBS | HEIGHT: 59 IN | TEMPERATURE: 97.6 F

## 2023-05-12 DIAGNOSIS — R09.89 OTHER SPECIFIED SYMPTOMS AND SIGNS INVOLVING THE CIRCULATORY AND RESPIRATORY SYSTEMS: ICD-10-CM

## 2023-05-12 PROCEDURE — 99024 POSTOP FOLLOW-UP VISIT: CPT

## 2023-05-12 PROCEDURE — 31237 NSL/SINS NDSC SURG BX POLYPC: CPT | Mod: 50,58

## 2023-05-12 RX ORDER — IPRATROPIUM BROMIDE 42 UG/1
0.06 SPRAY NASAL
Qty: 1 | Refills: 3 | Status: ACTIVE | COMMUNITY
Start: 2023-05-12 | End: 1900-01-01

## 2023-05-12 NOTE — PROCEDURE
[FreeTextEntry6] : procedure - bilateral endoscopic nasal  debridement\par Dx - crusting/ polypoid tissue \par Verbal consent obtained - discussed risks and benefits of intervention before proceeding \par Bilateral nasal cavities inspected with #0 ridged sinus endoscope. septum appeared midline and turbinates well reduced. bilateral middle meatuses were explored and suction and agitator were used to open ostiums and open any forming scar bands. all sinuses were explored and open at end of procedure\par  [de-identified] : \par \par

## 2023-05-12 NOTE — CONSULT LETTER
[Please see my note below.] : Please see my note below. [FreeTextEntry1] : Dear Dr. EZEKIEL CABELLO \par I had the pleasure of evaluating your patient ALE BECK, thank you for allowing us to participate in their care. please see full note detailing our visit below.\par If you have any questions, please do not hesitate to call me and I would be happy to discuss further. \par \par Arturo Ivory M.D.\par Attending Physician,  \par Department of Otolaryngology - Head and Neck Surgery\[facility] \par Office: [phone]\par Fax: [phone]\par \par

## 2023-05-12 NOTE — HISTORY OF PRESENT ILLNESS
[de-identified] : 56 y/o F pt s/p septo turbs nasal valve repair with graft 4/12/23. Pt following up and reports congestion and face soreness. States as day goes on, her palate hurts and inside of nose is tender since procedure. Patient is with difficulty with sleep due to pressure and congestion but otherwise recovering well. Bleeding has subsided. Patient is not using rinses.  [FreeTextEntry1] : Patient following up s/p septo turbs nasal valve repair with graft 4/12/23. Patient is doing well but states nose runs a lot. When she tilts her head, her nose drips. States this is not new, this has always been happening. Also complaining of blockage and sticky mucus up nose. Not doing any nasal sprays. Patient is only doing washes as needed. \par Pt states breathing is good overall, though sometimes feels blocked because of mucous.

## 2023-05-12 NOTE — ASSESSMENT
[FreeTextEntry1] : 57 y/o F with hx of sleep apnea, now with nasal congestion.On exam mild septal deviation and bilateral turbinate hypertrophy. Also on exam +mrak with nasal valve collapse with severe left caudal deflection - most of issue with breathing \par Pt now following up s/p septo turbs nasal valve repair with graft 4/12/23. patient follows up 1 month status post ESS, septoplasty with turbs.  The patient was debrided bilaterally with rigid suction as a result of nasal crusting. breathing much improved after detriment. \par - Pt reports stable runny nose she had pre op for the last 20 years and feels mucous in her nose- pt not doing washes and advised pt to do washes to see if this helps. Overall pt is breathing well- nose looks very open on scope today\par - discussed in office procedure to if sx of runny nose persist - would be careful because would not want to be to dry with increase airflow, advised pt will see if conservative tx helps for now as pt is only 1 month post-op. pt elected to start on nasal spray first to see if this helps-will consider further intervention if needed\par - will start pt on Atrovent to see if this helps with runny nose\par - will follow up to assure no scarring and keep sinuses open\par \par - happy with result

## 2023-05-12 NOTE — END OF VISIT
[FreeTextEntry3] : I personally saw and examined  the patient in detail.  I spoke to YAMEL Rpap regarding the assessment and plan of care. I performed the procedures and relevant physical exam.  I have reviewed the above assessment and plan of care and I agree.  I have made changes to the body of the note wherever necessary and appropriate

## 2023-06-30 ENCOUNTER — APPOINTMENT (OUTPATIENT)
Dept: OTOLARYNGOLOGY | Facility: CLINIC | Age: 58
End: 2023-06-30
Payer: MEDICAID

## 2023-06-30 VITALS — HEIGHT: 59 IN | TEMPERATURE: 96.7 F | WEIGHT: 195 LBS | BODY MASS INDEX: 39.31 KG/M2

## 2023-06-30 PROCEDURE — 31231 NASAL ENDOSCOPY DX: CPT | Mod: 58

## 2023-06-30 PROCEDURE — 99213 OFFICE O/P EST LOW 20 MIN: CPT | Mod: 25

## 2023-06-30 NOTE — ASSESSMENT
[FreeTextEntry1] : 55 y/o F with hx of sleep apnea, now with nasal congestion.On exam mild septal deviation and bilateral turbinate hypertrophy. Also on exam +mark with nasal valve collapse with severe left caudal deflection - most of issue with breathing. Pt reports stable runny nose she had pre op for the last 20 years and feels mucous in her nose.\par \par Pt now following up s/p septo turbs nasal valve repair with graft 4/12/23. On scope, healing well, right nostril more open than left, but both nostrils are very open. \par - pt not doing washes and advised pt to do washes to see if this helps. Overall pt is breathing well- nose looks very open on scope today. \par - advised patient swelling will fully resolve over time\par - discussed in office procedure to if sx of runny nose persist - would be careful because would not want to be to dry with increase airflow\par - continue Atrovent for runny nose. \par - happy with result

## 2023-06-30 NOTE — PROCEDURE
[FreeTextEntry6] : procedure - bilateral endoscopic nasal  debridement\par Dx - crusting/ polypoid tissue \par Verbal consent obtained - discussed risks and benefits of intervention before proceeding \par Bilateral nasal cavities inspected with #0 ridged sinus endoscope. septum appeared midline and turbinates well reduced. bilateral middle meatuses were explored and suction and agitator were used to open ostiums and open any forming scar bands. all sinuses were explored and open at end of procedure\par  [de-identified] : \par \par

## 2023-06-30 NOTE — HISTORY OF PRESENT ILLNESS
[de-identified] : 56 y/o F pt s/p septo turbs nasal valve repair with graft 4/12/23. Pt following up and reports congestion and face soreness. States as day goes on, her palate hurts and inside of nose is tender since procedure. Patient is with difficulty with sleep due to pressure and congestion but otherwise recovering well. Bleeding has subsided. Patient is not using rinses. \par \par Patient following up s/p septo turbs nasal valve repair with graft 4/12/23. Patient is doing well but states nose runs a lot. When she tilts her head, her nose drips. States this is not new, this has always been happening. Also complaining of blockage and sticky mucus up nose. Not doing any nasal sprays. Patient is only doing washes as needed. \par Pt states breathing is good overall, though sometimes feels blocked because of mucous.  [FreeTextEntry1] : Patient following up s/p septo turbs nasal valve repair with graft 4/12/23. States nostril didn’t open enough, feels a bit more open but still blocked. Feels collapse at top of nose, states what he fixed was inside and in middle. Using allergy medicine, using nasal spray daily which helps with nose running. Has not been doing washes. \par Some pain at tip of nose still, described as a soreness.

## 2023-06-30 NOTE — CONSULT LETTER
[Please see my note below.] : Please see my note below. [FreeTextEntry1] : Dear Dr. EZEKIEL CABELLO \par I had the pleasure of evaluating your patient ALE BECK, thank you for allowing us to participate in their care. please see full note detailing our visit below.\par If you have any questions, please do not hesitate to call me and I would be happy to discuss further. \par \par Arturo Ivory M.D.\par Attending Physician,  \par Department of Otolaryngology - Head and Neck Surgery\par [facility] \par Office: [phone]\par Fax: [phone]\par \par

## 2023-10-06 ENCOUNTER — APPOINTMENT (OUTPATIENT)
Dept: RHEUMATOLOGY | Facility: CLINIC | Age: 58
End: 2023-10-06
Payer: MEDICAID

## 2023-10-06 VITALS
HEART RATE: 75 BPM | HEIGHT: 59 IN | SYSTOLIC BLOOD PRESSURE: 118 MMHG | OXYGEN SATURATION: 99 % | TEMPERATURE: 98 F | WEIGHT: 200 LBS | BODY MASS INDEX: 40.32 KG/M2 | DIASTOLIC BLOOD PRESSURE: 80 MMHG

## 2023-10-06 DIAGNOSIS — G47.30 SLEEP APNEA, UNSPECIFIED: ICD-10-CM

## 2023-10-06 DIAGNOSIS — G47.33 OBSTRUCTIVE SLEEP APNEA (ADULT) (PEDIATRIC): ICD-10-CM

## 2023-10-06 DIAGNOSIS — K21.9 GASTRO-ESOPHAGEAL REFLUX DISEASE W/OUT ESOPHAGITIS: ICD-10-CM

## 2023-10-06 DIAGNOSIS — G47.00 INSOMNIA, UNSPECIFIED: ICD-10-CM

## 2023-10-06 DIAGNOSIS — R73.9 HYPERGLYCEMIA, UNSPECIFIED: ICD-10-CM

## 2023-10-06 PROCEDURE — G0008: CPT

## 2023-10-06 PROCEDURE — 90686 IIV4 VACC NO PRSV 0.5 ML IM: CPT

## 2023-10-06 PROCEDURE — 99214 OFFICE O/P EST MOD 30 MIN: CPT | Mod: 25

## 2023-10-06 PROCEDURE — 20553 NJX 1/MLT TRIGGER POINTS 3/>: CPT

## 2023-10-06 RX ORDER — CLARITHROMYCIN 500 MG/1
500 TABLET, FILM COATED ORAL TWICE DAILY
Qty: 20 | Refills: 0 | Status: DISCONTINUED | COMMUNITY
Start: 2023-04-05 | End: 2023-10-06

## 2023-10-06 RX ORDER — TRIAMCINOLONE ACETONIDE 40 MG/ML
40 SUSPENSION INTRA-ARTERIAL; INTRAMUSCULAR
Qty: 1 | Refills: 0 | Status: COMPLETED | OUTPATIENT
Start: 2023-10-06

## 2023-10-06 RX ORDER — METHYLPREDNISOLONE 4 MG/1
4 TABLET ORAL
Qty: 1 | Refills: 0 | Status: DISCONTINUED | COMMUNITY
Start: 2023-04-05 | End: 2023-10-06

## 2023-10-06 RX ORDER — ESTRADIOL 0.1 MG/G
0.1 CREAM VAGINAL
Qty: 1 | Refills: 0 | Status: DISCONTINUED | COMMUNITY
Start: 2020-03-10 | End: 2023-10-06

## 2023-10-06 RX ORDER — BENZONATATE 100 MG/1
100 CAPSULE ORAL
Qty: 15 | Refills: 0 | Status: DISCONTINUED | COMMUNITY
Start: 2023-04-12 | End: 2023-10-06

## 2023-10-06 RX ORDER — METFORMIN HYDROCHLORIDE 500 MG/1
500 TABLET, COATED ORAL
Qty: 1 | Refills: 1 | Status: ACTIVE | COMMUNITY
Start: 2023-10-06

## 2023-10-06 RX ORDER — OXYCODONE AND ACETAMINOPHEN 5; 325 MG/1; MG/1
5-325 TABLET ORAL
Qty: 8 | Refills: 0 | Status: DISCONTINUED | COMMUNITY
Start: 2023-04-05 | End: 2023-10-06

## 2023-10-06 RX ADMIN — TRIAMCINOLONE ACETONIDE 0 MG/ML: 40 INJECTION, SUSPENSION INTRA-ARTICULAR; INTRAMUSCULAR at 00:00

## 2023-10-20 ENCOUNTER — NON-APPOINTMENT (OUTPATIENT)
Age: 58
End: 2023-10-20

## 2023-10-20 ENCOUNTER — APPOINTMENT (OUTPATIENT)
Dept: DERMATOLOGY | Facility: CLINIC | Age: 58
End: 2023-10-20
Payer: MEDICAID

## 2023-10-20 VITALS — BODY MASS INDEX: 40.32 KG/M2 | WEIGHT: 200 LBS | HEIGHT: 59 IN

## 2023-10-20 DIAGNOSIS — L81.4 OTHER MELANIN HYPERPIGMENTATION: ICD-10-CM

## 2023-10-20 DIAGNOSIS — D18.01 HEMANGIOMA OF SKIN AND SUBCUTANEOUS TISSUE: ICD-10-CM

## 2023-10-20 DIAGNOSIS — L82.0 INFLAMED SEBORRHEIC KERATOSIS: ICD-10-CM

## 2023-10-20 PROCEDURE — 99204 OFFICE O/P NEW MOD 45 MIN: CPT | Mod: 25

## 2023-10-20 PROCEDURE — 17110 DESTRUCTION B9 LES UP TO 14: CPT

## 2024-01-19 ENCOUNTER — APPOINTMENT (OUTPATIENT)
Dept: DERMATOLOGY | Facility: CLINIC | Age: 59
End: 2024-01-19
Payer: MEDICAID

## 2024-01-19 DIAGNOSIS — L21.9 SEBORRHEIC DERMATITIS, UNSPECIFIED: ICD-10-CM

## 2024-01-19 DIAGNOSIS — L57.0 ACTINIC KERATOSIS: ICD-10-CM

## 2024-01-19 PROCEDURE — 99214 OFFICE O/P EST MOD 30 MIN: CPT

## 2024-01-19 RX ORDER — FLUOROURACIL 50 MG/G
5 CREAM TOPICAL
Qty: 1 | Refills: 1 | Status: ACTIVE | COMMUNITY
Start: 2023-10-20 | End: 1900-01-01

## 2024-01-19 RX ORDER — HYDROCORTISONE VALERATE 2 MG/G
0.2 CREAM TOPICAL
Qty: 1 | Refills: 2 | Status: ACTIVE | COMMUNITY
Start: 2024-01-19 | End: 1900-01-01

## 2024-01-19 NOTE — HISTORY OF PRESENT ILLNESS
[de-identified] : f/u for check of face- used 5FU in Fall 2023 with very good results also c/o scaling, crusting around nose- used HC in past

## 2024-01-19 NOTE — PHYSICAL EXAM
[FreeTextEntry3] : Face;  + lentigines and solar damage are present in sun exposed areas;  minimal residual AKs; around hairline, L forehead  Scaling patches located on face-  NLF, cheeks; perinasal

## 2024-01-19 NOTE — ASSESSMENT
[FreeTextEntry1] : AKs on face did very well Will treat chest, also focal residual areas on hairline, L forehead, R temple may treat lower face also   Therapeutic options and their risks and benefits; along with multiple diagnostic possibilities were discussed at length; risks and benefits of further study were discussed;  seb derm; cheeks, perinasal westcort cream BID prn;  do not overuse  f/u for skin check in 10/2023

## 2024-02-16 ENCOUNTER — APPOINTMENT (OUTPATIENT)
Dept: OTOLARYNGOLOGY | Facility: CLINIC | Age: 59
End: 2024-02-16
Payer: MEDICAID

## 2024-02-16 VITALS — BODY MASS INDEX: 40.12 KG/M2 | WEIGHT: 199 LBS | HEIGHT: 59 IN | TEMPERATURE: 97.1 F

## 2024-02-16 DIAGNOSIS — R09.81 NASAL CONGESTION: ICD-10-CM

## 2024-02-16 DIAGNOSIS — J32.4 CHRONIC PANSINUSITIS: ICD-10-CM

## 2024-02-16 DIAGNOSIS — J34.89 OTHER SPECIFIED DISORDERS OF NOSE AND NASAL SINUSES: ICD-10-CM

## 2024-02-16 DIAGNOSIS — J34.3 HYPERTROPHY OF NASAL TURBINATES: ICD-10-CM

## 2024-02-16 DIAGNOSIS — Z98.890 OTHER SPECIFIED POSTPROCEDURAL STATES: ICD-10-CM

## 2024-02-16 PROCEDURE — 31231 NASAL ENDOSCOPY DX: CPT

## 2024-02-16 PROCEDURE — 99213 OFFICE O/P EST LOW 20 MIN: CPT | Mod: 25

## 2024-02-16 NOTE — ASSESSMENT
[FreeTextEntry1] : 59 y/o F following up s/p septo turbs nasal valve repair with graft 4/12/23. On scope, healing well, right nostril more open than left, but both nostrils are very open. Overall pt is breathing well- nose looks very open on scope today.  - discussed rhinear procedure to if sx of runny nose persist - would be careful because would not want to be too dry with increase airflow - continue Atrovent for runny nose.  happy with result

## 2024-02-16 NOTE — PROCEDURE
[FreeTextEntry6] : procedure - bilateral endoscopic nasal  debridement\par  Dx - crusting/ polypoid tissue \par  Verbal consent obtained - discussed risks and benefits of intervention before proceeding \par  Bilateral nasal cavities inspected with #0 ridged sinus endoscope. septum appeared midline and turbinates well reduced. bilateral middle meatuses were explored and suction and agitator were used to open ostiums and open any forming scar bands. all sinuses were explored and open at end of procedure\par   [de-identified] : \par  \par

## 2024-02-16 NOTE — HISTORY OF PRESENT ILLNESS
[de-identified] : 56 y/o F pt s/p septo turbs nasal valve repair with graft 4/12/23. Pt following up and reports congestion and face soreness. States as day goes on, her palate hurts and inside of nose is tender since procedure. Patient is with difficulty with sleep due to pressure and congestion but otherwise recovering well. Bleeding has subsided. Patient is not using rinses. \par  \par  Patient following up s/p septo turbs nasal valve repair with graft 4/12/23. Patient is doing well but states nose runs a lot. When she tilts her head, her nose drips. States this is not new, this has always been happening. Also complaining of blockage and sticky mucus up nose. Not doing any nasal sprays. Patient is only doing washes as needed. \par  Pt states breathing is good overall, though sometimes feels blocked because of mucous.  [FreeTextEntry1] : Patient following up s/p septo turbs nasal valve repair with graft 4/12/23. Nose feels a lot better, states numbness resolved and is not looking like a pig nose anymore. States doesnt feel fake anymore. Doing very well, breathing well.

## 2024-02-16 NOTE — CONSULT LETTER
[Please see my note below.] : Please see my note below. [FreeTextEntry1] : Dear Dr. EZEKIEL CABELLO \par  I had the pleasure of evaluating your patient ALE BECK, thank you for allowing us to participate in their care. please see full note detailing our visit below.\par  If you have any questions, please do not hesitate to call me and I would be happy to discuss further. \par  \par  Arturo Ivory M.D.\par  Attending Physician,  \par  Department of Otolaryngology - Head and Neck Surgery\par  [facility] \par  Office: [phone]\par  Fax: [phone]\par  \par

## 2024-04-05 ENCOUNTER — APPOINTMENT (OUTPATIENT)
Dept: RHEUMATOLOGY | Facility: CLINIC | Age: 59
End: 2024-04-05
Payer: MEDICAID

## 2024-04-05 VITALS
SYSTOLIC BLOOD PRESSURE: 118 MMHG | OXYGEN SATURATION: 98 % | WEIGHT: 200 LBS | HEART RATE: 101 BPM | HEIGHT: 59 IN | DIASTOLIC BLOOD PRESSURE: 86 MMHG | BODY MASS INDEX: 40.32 KG/M2 | TEMPERATURE: 97.6 F

## 2024-04-05 DIAGNOSIS — E66.01 MORBID (SEVERE) OBESITY DUE TO EXCESS CALORIES: ICD-10-CM

## 2024-04-05 DIAGNOSIS — M25.562 PAIN IN LEFT KNEE: ICD-10-CM

## 2024-04-05 DIAGNOSIS — M79.7 FIBROMYALGIA: ICD-10-CM

## 2024-04-05 DIAGNOSIS — R76.0 RAISED ANTIBODY TITER: ICD-10-CM

## 2024-04-05 DIAGNOSIS — M79.10 MYALGIA, UNSPECIFIED SITE: ICD-10-CM

## 2024-04-05 DIAGNOSIS — R76.8 OTHER SPECIFIED ABNORMAL IMMUNOLOGICAL FINDINGS IN SERUM: ICD-10-CM

## 2024-04-05 PROCEDURE — 99213 OFFICE O/P EST LOW 20 MIN: CPT | Mod: 25

## 2024-04-05 PROCEDURE — 20552 NJX 1/MLT TRIGGER POINT 1/2: CPT | Mod: RT

## 2024-04-05 RX ORDER — DICLOFENAC SODIUM 1% 10 MG/G
1 GEL TOPICAL
Qty: 300 | Refills: 3 | Status: ACTIVE | COMMUNITY
Start: 2023-04-02 | End: 1900-01-01

## 2024-04-05 RX ADMIN — TRIAMCINOLONE ACETONIDE 0 MG/ML: 80 INJECTION, SUSPENSION INTRA-ARTICULAR; INTRAMUSCULAR at 00:00

## 2024-05-14 NOTE — PROCEDURE
[Today's Date:] : Date: [unfilled] [Soft Tissue Injection] : soft tissue injection was performed [Risks] : risks [Benefits] : benefits [Alternatives] : alternatives [Consent Obtained] : written consent was obtained prior to the procedure and is detailed in the patient's record [Patient] : Prior to the start of the procedure a time out was taken and the identity of the patient was confirmed via name and date of birth with the patient. The correct site and the procedure to be performed were confirmed. The correct side was confirmed if applicable. The availability of the correct equipment was verified [#1 Site: ______] : #1 site identified in the [unfilled] [___ ml Inj] : [unfilled] ~Uml [1%] : 1%  [Alcohol] : alcohol [25 gauge 1.5 inch] : A 25 gauge 1.5 inch needle was used [Tolerated Well] : the patient tolerated the procedure well [No Complications] : there were no complications [Instructions Given] : handouts/patient instructions were given to patient [Patient Instructed to Call] : patient was instructed to call if redness at site, a decrease in range of motion or an increase in pain is noted after procedure. [de-identified] : 80 mg of Kenalog [FreeTextEntry1] : Ice routinely 20/20 for the next 24 hrs and tj if pain not improved w/in 72 hr or if worsened joint pain, or signs of infection including fever, chills, redness at site ensues. This is a strong steroid. It can cause anxiety, hunger, irritability, trouble sleeping, rarely causes palpitations or chest pain but if present go to to ER and then let me know.

## 2024-05-14 NOTE — ASSESSMENT
[FreeTextEntry1] : 57yo perimenopausal obese wf w/ HLD, mild HTN, hx shingles/ and erhlichiosis (both tx), + LAC w/ intermittently low level ACL/ B2G... 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 w/ + LAC, + dsDNA low 35, nl C3/4 and high titer B2G IgM > 150. UPdated through [facility]  all NEG except high titer B2G ...  Nl CBC, CMP...  HTN at times considerable..  stable overall, last seen 6 m ago- did not do labs then or now.. nothing recent.. orders on file but still  neg s/s of SLE, no bleeding/ clotting dyscrasias. Overall feeling better with improvement in fatigue and polyarthralgias...   + APS antibodies only: continues to have no sx of bleeding/ clotting dyscrasia or cytopenias otherwise. Minimal c/o HA.. overall doing very well- in past presented w/ asymmetrical pauciarticular joint pain/ swelling. Nothing for past few ys No active/ recent inflammatory joint sx..   2016  + LAC w/ B2G IGM > 150, ACL IGM + and PTT slightly elevated w/ neg PTS Dec  2016  - LAC w/ neg ACL, nl PTT and neg PTS.  Mar  2016  - LAC, + ACL IGM 24, B2G IGM > 150, neg PTS, nl PTT...  2019  - LAC (DRVVT) / + silica w/ low + ACL IgM nl PTT w/ all other studies neg/ nl  2020   +LAC, (DRVVT), B2G IGM > 150, ACL IGM 28,  phosphatidyl IGM 89, w/ neg PS/PT antibodies,  dsDNA 44, high C3/4, ESR  NEG except B2G , ESR 34 LAC not done.. all other studies negative as before-> updated studies needed  NOTE:   severe bleeding throughout, 1 miscarriage 1st trimester and 3 elective ab...no other cytopenias/ bleeding/ clotting dyscrasias...  No personal or FH psoriasis, AS, inflammatory back, bowel or eye dz - doesn't tolerate steroids well- everything feels miserable with change in mental status- fogginess and altered personality  2) Chronic pain, mild FM: diffuse for many ys managed well controlled at this point though fatigue persists.  Off all NSAIDs and psychotropic agents... 2/2 HTN...Overall feeling better... .. topical NSAIDs + response will continue  OVerall doing well past yr, does note some increase in muscle cramping severe day/ night.. no tx to date Again with trigger point in R thoracic region- likely spasm in latissimus dorsi.. excellent response to trigger point injection last fall- only recently started to become active again.. given another today 80 mg kenalog/ with 1 ml xylocaine.   Additionally NEW c/o pain medial/ inferior aspect knee c/w anserine bursitis.. will try topical therapy and ideally steroids provided today should help, if not effective may come in for injection.   - Sleep: confirmed mod GARRET w/ long episodes as well as severe  sleep latency up to 2-3 hs and then only 4-6 hs disrupted 2-4 x wakes.. ? oral appliance / compliance?   Excellent response to gabapentin - everything better low dose but didn't continue 2/2 HTN.and wt gain.. . Still hasn't tried melatonin - Migraines fully resolved  - CP:  nothing recently mild recent cardiac stress test for long term mild - negative,  H2/ PPI w/ + response  though still mild persistent GERD sx.  Last EGD 2 ys ago..  - Dyscognition:  Significant issue in past doing well now  - Bladder:  IC overactive, incontinence doing well on oxyybutin but stopped 2/2 concerns HTN stable, tolerable  - paresthesias/ post herpetic neuralgia L sided T 2 dermatome:  migratory, mild..NOW resolved - IBS:  D/ C controlled w/ prn hyoscyamine  - Long hx LBP- overall much improved... more active now..but with isolate pain at R side lower thoracic region c/w trigger point. Given TPI today.. and advised myofascial release SM techniques... resources provided ..again today..  No recent radiculopathy..  NOTE:  - flexeril/ tizanidine not tolerated and doesn't want to retry -  stopped gabapentin 2/2 concerns fluid retention/ and ? HTN..   3) GERD/ hiatal hernia cannot miss PPI.  Ranitidine not helpful, tums (hasn't tried carafate) GI, Dr. Lopez otherwise no significant GI issues-sulcrafate now PRN  4) Perimenopausal:  Perimenopausal sx severe at times w/ Dexa  nl mild intermittent sx now  5) Obesity:  BMI 38- now 39- 41->43-> 39 -> 40 again with progressive increase over past few ys.  Now with - HTN:  well controlled now.. .but needs to minimize NSAIDs still  - HLD:  noted mild ... will need to monitor - T2DM:  HbA1c 6.3 ... needs careful monitoring here too..now on metformin 1000 mg daily and added ozempic with minimal wt loss to date   Plan: - complete labs now and prior to next visit   - Administered 80 mg of Kenalog in R thoracic paraspinal muscle- latissimus dorsi  - use diclofenac gel 4x daily for L knee pain needs refill for topical  - RTO in 6 months....call the office to come in sooner if L knee pain becomes debilitating  - reassurance provided, still liltte to support SLE / or APS diagnosis.. does not meet current criteria for either. Needs to continue to focus on healthy lifestyle through diet/ exercise, stress mangement (admits to chronic persistent stress).    - RTO in 6 mnths to 1 year

## 2024-05-14 NOTE — HISTORY OF PRESENT ILLNESS
[FreeTextEntry1] : 2024  - she has been good - " I think my inflammation has gone down because I can't really complain of body pain." - no joint or myofascial pain, but does have intermittent L knee pain causing her to walk w/a limp ("Some day I see stars, it is so painful') - reports previous trigger point in her back is back and severe.....reports she felt good a month after getting a trigger point injection in the area from this office, but the pain slowly returned and now "it hurts like a son of a bitch"  - on Mg 250 mg daily and it is helpful for her cramps and spasming - has also been drinking a lot of water and fruits  - diabetes has been good and now at prediabetic level - still on Ozempic - reports she has been eating poorly (high carb diet), because of higher price of healthier foods  - reports a 10 lbs wt gain (but wt is stable since her last visit)   10/6/23...  Overall continues to not feel great, frustrated with lack of wt loss but diffuse severe widespread pain remains resolved.  After nearly 3 year absence... was concerned about brain fog, mild diffuse arthralgias, and abnormal labs..  Updated rheum studies to include [facility] only + for B2G IGM high titer 576 but all other studies to include phohphatidyl serine and LAC negative. ESR 34/ w/ nl CRP, C3/4 - focusing on losing wt for past few ys with little improvement overall, has been on Ozempic 0.5 mg wkly and has actually gained wt - #1 issue is pain along lower thoracic region R sided paraspinal - severe/ persistent - has had extensive eval, no fractures, w/u negative to date. ?? was T spine MR done?  No treatment to date.  Reports that can't tolerate any type of muscle relaxant and can't use NSAIDs 2/2 HTN issue, even low doses of OTC cause BP increase and then interferes with sleep - also c/o CMC pain at times severe L > R ... topical nsaids some but incomplete response..Has not tried APAP  - Still no longer on medication for her mood since her sleep improved.. and overall doing fairly well with nasal appliance and nasal surgery.. no longer needs to wear a mask. Sleep has improved markedly.   - ROS neg except for vaginal dryness, dry cough, postnasal drip, and sleep disturbance (night sweats- occas) - denies ever having COVID and has had all 4 vaccinations plans on getting update     1) POsitive EV/ Chronic pain (FM):  1:160 H. w/ + LAC, high titer B2G IgM > 150 (persistent)- [facility] neg VE still + B2G high titer w/ neg LAC  2)  Inflammatory arthropathy (possible):  little today to suggest but does give hx of possible inflammatory arthropathy but NO personal or family hx of psoriais, IBD, inflammatory eye dz, AS, does have + FH RA  NOTE:  doesn't tolerate steroids well- everything feels miserable with change in mental status- fogginess and altered personality, migraines in past  3) Long hx LBP- s.    4) GERD/ hiatal hernia cannot miss PPI.  Ranitidine not helpful, tums (hasn't tried carafate) GI, Dr. oLpez otherwise no significant GI issues-  5) Perimenopausal:  Perimenopausal sx severe at times w/ Dexa  nl   6) Obesity:  BMI 38 needs to be addressed, contributes to "unwell" state and potentiates inflammatory state.  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  Associated w/ stiffness most significant inflammatory back sx L > R  30-60 mins but all other joints also stiff.  Usually on consistent for past year- no overt swelling in any joints-  Using Aleve 2-4 tabs daily w/ nearly complete relief.  Hypersensitivity to touch Migraines in past,  3 elective ab, 1 1st trimester, no DVT, PE, MI, CVA Recent Cardiac stress test for long term H2/ PPI and obesity   NO personal or family hx of psoriais, IBD, inflammatory eye dz, AS  Long hx LBP- localized so severe can't walk, at times can't tolerate more than 15-30 mins.  Intermittent episodes of radiculopathy only on L side (steroids required in past)  Heel pain:  b/l L worse than R, xrays suggest osteoporosis.  MRI pending  w/ significant hot flashes for past year- periods irreg- LMP 6 mnths ago Significant issue w/ dyscognition Lives with GERD/ hiatal hernia cannot miss PPI.  Ranitidine not helpful, tums (hasn't tried carafate) GI, Dr. Lopez otherwise no significant GI issues- Bladder:  IC overactive, incontinence doing well on oxyybutin- does have severe dry mouth with this, and exacerbates asthma. ROS:  no significant fatigue, no fevers, lymphandenopathy, dry mouth drug related, no dry eyes; serositis, thinning in male pattern, mild intermittent cough r/t asthma.  no renal/ liver dysfunction + Lyme and HPV in past     Asthma:  fairly well controlled on daily H2- allegra D daily- nothing else, poor tolerance in past.  Rare need for inhalers.   NOTE:  doesn't tolerate steroids well- everything feels miserable with change in mental status- fogginess and altered personality, migraines in past    Bone Health  FH:  + maternal aunts w/ RA

## 2024-05-14 NOTE — REVIEW OF SYSTEMS
[Cough] : cough [Heartburn] : heartburn [Arthralgias] : arthralgias [Joint Pain] : joint pain [Sleep Disturbances] : sleep disturbances [Negative] : Heme/Lymph [As Noted in HPI] : as noted in HPI [Skin Lesions] : no skin lesions [FreeTextEntry2] : doing better since her last visit - wt stable  [FreeTextEntry4] : closed nostril, scheduled for deviated septum repair - not discussed today [FreeTextEntry6] : post nasal drip- overall has been better till recently - no c/o today [FreeTextEntry8] : vaginal dryness - no c/o today [FreeTextEntry9] : R sided thoracic trigger point, and intermittent L knee pain  [de-identified] : no infections, well healed  [de-identified] : T 2 L sided post herpetic neuralgia resolved w/ scarring but no further pain [de-identified] : off all psych medication since improvement in sleep; night sweats still disrupts sleep

## 2024-05-14 NOTE — PHYSICAL EXAM
[General Appearance - Alert] : alert [General Appearance - In No Acute Distress] : in no acute distress [General Appearance - Well Developed] : well developed [General Appearance - Well-Appearing] : healthy appearing [Auscultation Breath Sounds / Voice Sounds] : lungs were clear to auscultation bilaterally [Heart Rate And Rhythm] : heart rate was normal and rhythm regular [Heart Sounds] : normal S1 and S2 [Heart Sounds Gallop] : no gallops [Murmurs] : no murmurs [Heart Sounds Pericardial Friction Rub] : no pericardial rub [Edema] : there was no peripheral edema [No CVA Tenderness] : no ~M costovertebral angle tenderness [No Spinal Tenderness] : no spinal tenderness [Abnormal Walk] : normal gait [Nail Clubbing] : no clubbing  or cyanosis of the fingernails [Musculoskeletal - Swelling] : no joint swelling seen [Motor Tone] : muscle strength and tone were normal [Skin Color & Pigmentation] : normal skin color and pigmentation [Skin Turgor] : normal skin turgor [No Focal Deficits] : no focal deficits [Oriented To Time, Place, And Person] : oriented to person, place, and time [Impaired Insight] : insight and judgment were intact [Affect] : the affect was normal [] : no respiratory distress [Respiration, Rhythm And Depth] : normal respiratory rhythm and effort [Exaggerated Use Of Accessory Muscles For Inspiration] : no accessory muscle use [FreeTextEntry1] : fast speech but relaxed

## 2024-05-15 RX ORDER — TRIAMCINOLONE ACETONIDE 80 MG/ML
80 INJECTION, SUSPENSION INTRA-ARTICULAR; INTRAMUSCULAR
Qty: 1 | Refills: 0 | Status: COMPLETED | OUTPATIENT
Start: 2024-04-05

## 2024-10-03 ENCOUNTER — NON-APPOINTMENT (OUTPATIENT)
Age: 59
End: 2024-10-03

## 2024-10-04 ENCOUNTER — APPOINTMENT (OUTPATIENT)
Dept: RHEUMATOLOGY | Facility: CLINIC | Age: 59
End: 2024-10-04
Payer: MEDICAID

## 2024-10-04 VITALS
BODY MASS INDEX: 39.11 KG/M2 | TEMPERATURE: 96.8 F | SYSTOLIC BLOOD PRESSURE: 112 MMHG | WEIGHT: 194 LBS | HEART RATE: 94 BPM | HEIGHT: 59 IN | DIASTOLIC BLOOD PRESSURE: 68 MMHG | OXYGEN SATURATION: 97 %

## 2024-10-04 DIAGNOSIS — J32.4 CHRONIC PANSINUSITIS: ICD-10-CM

## 2024-10-04 DIAGNOSIS — E66.9 OBESITY, UNSPECIFIED: ICD-10-CM

## 2024-10-04 DIAGNOSIS — Z00.00 ENCOUNTER FOR GENERAL ADULT MEDICAL EXAMINATION W/OUT ABNORMAL FINDINGS: ICD-10-CM

## 2024-10-04 DIAGNOSIS — R76.8 OTHER SPECIFIED ABNORMAL IMMUNOLOGICAL FINDINGS IN SERUM: ICD-10-CM

## 2024-10-04 DIAGNOSIS — D68.61 ANTIPHOSPHOLIPID SYNDROME: ICD-10-CM

## 2024-10-04 DIAGNOSIS — K21.9 GASTRO-ESOPHAGEAL REFLUX DISEASE W/OUT ESOPHAGITIS: ICD-10-CM

## 2024-10-04 DIAGNOSIS — M17.12 UNILATERAL PRIMARY OSTEOARTHRITIS, LEFT KNEE: ICD-10-CM

## 2024-10-04 PROCEDURE — 99213 OFFICE O/P EST LOW 20 MIN: CPT | Mod: 25

## 2024-10-04 PROCEDURE — 90656 IIV3 VACC NO PRSV 0.5 ML IM: CPT

## 2024-10-04 PROCEDURE — G0008: CPT

## 2024-10-04 NOTE — HISTORY OF PRESENT ILLNESS
[FreeTextEntry1] : 10/4/24  Claims that pain is vastly improved but doesn't know why Says she's eating poorly, lots of carbs - believes her weight gain and loss is extremely dependent on carb intake Gained 10 pounds since starting Ozempic, increased Ozempic dosage now at 20 mg, went back down 6lbs- when at effective dose "food noise goes away"...  But complains of intense GERD exacerbated by Ozempic - 1 or 2 tabs sucralfate at night helps most of the time  Avoiding 20mg Omeprazole Still on 500mg Metformin BID   Claims sleep has not improved, has worsened  Past surgery for deviated septum -now feels good, sx swelling took > 12 m to resolve Helped but still has frequent rhinorrhea Had some recent episodes of vomiting but allergy meds have resolved vomiting  Labs  stable overall, EV neg, C3/4 still slightly elevated, nl CBC, CMP. CK,  A1c 5.7 (from 6.1), ESr stable at 34-few ys ago > 67../ CRP 5  APS studies not done- no episodes of CP, SOB/ CVA/ TIA, DVT, PE..   - ROS neg except for vaginal dryness, dry cough, postnasal drip, and sleep disturbance (night sweats- occas) - denies ever having COVID and has had all 4 vaccinations plans on getting update     1) POsitive EV/ Chronic pain (FM):  1:160 H. w/ + LAC, high titer B2G IgM > 150 (persistent)- [facility] neg EV still + B2G high titer w/ neg LAC  2)  Inflammatory arthropathy (possible):  little today to suggest but does give hx of possible inflammatory arthropathy but NO personal or family hx of psoriais, IBD, inflammatory eye dz, AS, does have + FH RA  NOTE:  doesn't tolerate steroids well- everything feels miserable with change in mental status- fogginess and altered personality, migraines in past  3) Long hx LBP- s.    4) GERD/ hiatal hernia cannot miss PPI.  Ranitidine not helpful, tums (hasn't tried carafate) GI, Dr. Lopez otherwise no significant GI issues-  5) Perimenopausal:  Perimenopausal sx severe at times w/ Dexa  nl   6) Obesity:  BMI 38 needs to be addressed, contributes to "unwell" state and potentiates inflammatory state.  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  Associated w/ stiffness most significant inflammatory back sx L > R  30-60 mins but all other joints also stiff.  Usually on consistent for past year- no overt swelling in any joints-  Using Aleve 2-4 tabs daily w/ nearly complete relief.  Hypersensitivity to touch Migraines in past,  3 elective ab, 1 1st trimester, no DVT, PE, MI, CVA Recent Cardiac stress test for long term H2/ PPI and obesity 2016  NO personal or family hx of psoriais, IBD, inflammatory eye dz, AS  Long hx LBP- localized so severe can't walk, at times can't tolerate more than 15-30 mins.  Intermittent episodes of radiculopathy only on L side (steroids required in past)  Heel pain:  b/l L worse than R, xrays suggest osteoporosis.  MRI pending  w/ significant hot flashes for past year- periods irreg- LMP 6 mnths ago Significant issue w/ dyscognition Lives with GERD/ hiatal hernia cannot miss PPI.  Ranitidine not helpful, tums (hasn't tried carafate) GI, Dr. Lopez otherwise no significant GI issues- Bladder:  IC overactive, incontinence doing well on oxyybutin- does have severe dry mouth with this, and exacerbates asthma. ROS:  no significant fatigue, no fevers, lymphandenopathy, dry mouth drug related, no dry eyes; serositis, thinning in male pattern, mild intermittent cough r/t asthma.  no renal/ liver dysfunction + Lyme and HPV in past     Asthma:  fairly well controlled on daily H2- allegra D daily- nothing else, poor tolerance in past.  Rare need for inhalers.   NOTE:  doesn't tolerate steroids well- everything feels miserable with change in mental status- fogginess and altered personality, migraines in past    Bone Health  FH:  + maternal aunts w/ RA

## 2024-10-04 NOTE — REVIEW OF SYSTEMS
[Cough] : cough [Heartburn] : heartburn [Arthralgias] : arthralgias [Joint Pain] : joint pain [Sleep Disturbances] : sleep disturbances [Negative] : Heme/Lymph [As Noted in HPI] : as noted in HPI [Skin Lesions] : no skin lesions [FreeTextEntry2] : doing better since her last visit - wt stable  [FreeTextEntry4] :  deviated septum repair - with excellent outcome, breathing more comfortably now [FreeTextEntry6] : post nasal drip- overall has been better till recently - no c/o today- but mild nausea/vomiting better with antihistamines [FreeTextEntry7] : chronic GERD, worse w/ Ozempic [FreeTextEntry8] : vaginal dryness - no c/o today [FreeTextEntry9] : R sided thoracic trigger point, and intermittent L knee pain - FULLY RESOLVED  [de-identified] : no infections, well healed  [de-identified] : T 2 L sided post herpetic neuralgia resolved w/ scarring but no further pain [de-identified] : off all psych medication since improvement in sleep; night sweats still disrupts sleep

## 2024-10-04 NOTE — ASSESSMENT
[FreeTextEntry1] : 58yo perimenopausal obese wf w/ HLD, mild HTN, hx shingles/ and erhlichiosis (both tx), + LAC w/ intermittently low level ACL/ B2G... 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 w/ + LAC, + dsDNA low 35, nl C3/4 and high titer B2G IgM > 150. UPdated through [facility]  all NEG except high titer B2G ...  Nl CBC, CMP...  HTN at times considerable..  stable overall, last seen 12 m ago- Labs  again neg EV, C3/4 high not low and neg dsDNA- APS studies not done this time.  + APS antibodies only: continues to have no sx of bleeding/ clotting dyscrasia or cytopenias otherwise. Minimal c/o HA.. overall doing very well- in past presented w/ asymmetrical pauciarticular joint pain/ swelling. Nothing for past few ys No active/ recent inflammatory joint sx..   2016  + LAC w/ B2G IGM > 150, ACL IGM + and PTT slightly elevated w/ neg PTS Dec  2016  - LAC w/ neg ACL, nl PTT and neg PTS.  Mar  2016  - LAC, + ACL IGM 24, B2G IGM > 150, neg PTS, nl PTT...  2019  - LAC (DRVVT) / + silica w/ low + ACL IgM nl PTT w/ all other studies neg/ nl  2020   +LAC, (DRVVT), B2G IGM > 150, ACL IGM 28,  phosphatidyl IGM 89, w/ neg PS/PT antibodies,  dsDNA 44, high C3/4, ESR  NEG except B2G , ESR 34 LAC not done.. all other studies negative as before-> updated studies needed  NOTE:   severe bleeding throughout, 1 miscarriage 1st trimester and 3 elective ab...no other cytopenias/ bleeding/ clotting dyscrasias...  No personal or FH psoriasis, AS, inflammatory back, bowel or eye dz - doesn't tolerate steroids well- everything feels miserable with change in mental status- fogginess and altered personality  2) Chronic pain, mild FM: diffuse for many ys managed well controlled at this point though fatigue persists.  Off all NSAIDs and psychotropic agents... 2/2 HTN...Overall feeling better... .. topical NSAIDs + response will continue  OVerall doing well past yr, with NO complaints of pain.  No change in tx needed.   TPI to R thoracic region 10/23 ++ response, no return..- likely spasm in latissimus dorsi.. (80 mg kenalog/ xylocaine 1ml)     - Sleep: confirmed mod GARRET w/ long episodes as well as severe  sleep latency up to 2-3 hs and then only 4-6 hs disrupted 2-4 x wakes.. ? oral appliance / compliance? Felt this was better with 25 lb wt loss.. ongoing issue now but stable... still  xcellent response to gabapentin but over past year stopped with no return. Doing well at this time. No change needed  - Migraines fully resolved  - CP:  nothing recently mild recent cardiac stress test for long term mild - negative,  H2/ PPI w/ + response  though still mild persistent GERD sx.  Last EGD 3 ys ago.. PRN use sulcrafate still + response  - Dyscognition:  Significant issue in past doing well now  - Bladder:  IC overactive, incontinence doing well on oxyybutin but stopped 2/2 concerns HTN stable, tolerable  - paresthesias/ post herpetic neuralgia L sided T 2 dermatome:  migratory, mild..NOW resolved - IBS:  D/ C controlled w/ prn hyoscyamine and doint well  - Long hx LBP- overall much improved... more active now...  No recent radiculopathy..  NOTE:  - flexeril/ tizanidine not tolerated and doesn't want to retry -  stopped gabapentin 2/2 concerns fluid retention/ and ? HTN..   3) GERD/ hiatal hernia cannot miss PPI- in past, now monotx PRN use sulcrafate.. .  Ranitidine not helpful, tums GI, Dr. Lopez otherwise no significant GI issues  4) Perimenopausal:  Perimenopausal sx severe at times w/ Dexa  nl mild intermittent sx now  5) Obesity:  BMI 38- now 39- 41->43-> 39 again with progressive increase over past few ys.  Now with - HTN:  well controlled now.. .but needs to minimize NSAIDs still  - HLD:  noted mild ... will need to monitor - T2DM:  HbA1c 6.3-> 5.7  ... needs careful monitoring here too..now on metformin 1000 mg daily and added ozempic now at 20 mg.. (helps keep "food" noise down) with minimal wt loss to date- max 25 lbs, regained > 10 Admits to NOT healthy eating.   Plan 10/4/24: - Gave regular  flu vax today  - Stable plan-> remain off meds minimze where possible  - Order new labs/blood work to be done prior to next annual visit  - -Is aware to call if there is any change in her underlying symptoms.  - Follow-up in 1yr  - OK to use diclofenac gel 4x daily for L knee pain/ or other joint issues PRN needs refill for topical  - reassurance provided, still liltte to support SLE / or APS diagnosis.. does not meet current criteria for either. Needs to continue to focus on healthy lifestyle through diet/ exercise, stress mangement (admits to chronic persistent stress).

## 2024-10-04 NOTE — PHYSICAL EXAM
[General Appearance - Alert] : alert [General Appearance - In No Acute Distress] : in no acute distress [General Appearance - Well Developed] : well developed [General Appearance - Well-Appearing] : healthy appearing [Respiration, Rhythm And Depth] : normal respiratory rhythm and effort [Exaggerated Use Of Accessory Muscles For Inspiration] : no accessory muscle use [Auscultation Breath Sounds / Voice Sounds] : lungs were clear to auscultation bilaterally [Heart Rate And Rhythm] : heart rate was normal and rhythm regular [Heart Sounds] : normal S1 and S2 [Heart Sounds Gallop] : no gallops [Murmurs] : no murmurs [Heart Sounds Pericardial Friction Rub] : no pericardial rub [Edema] : there was no peripheral edema [No CVA Tenderness] : no ~M costovertebral angle tenderness [No Spinal Tenderness] : no spinal tenderness [Skin Color & Pigmentation] : normal skin color and pigmentation [Skin Turgor] : normal skin turgor [No Focal Deficits] : no focal deficits [Oriented To Time, Place, And Person] : oriented to person, place, and time [Impaired Insight] : insight and judgment were intact [Affect] : the affect was normal [Abnormal Walk] : normal gait [Nail Clubbing] : no clubbing  or cyanosis of the fingernails [Musculoskeletal - Swelling] : no joint swelling seen [Motor Tone] : muscle strength and tone were normal [Sclera] : the sclera and conjunctiva were normal [] : the neck was supple [FreeTextEntry1] : L anserine bursitis- NO longer TTP; mild squaring of CMC bl w/mild ttt on the R ; neg Finkelstein; L knee pain- no longer swollen and nearly fROM w/ no pom/ warmth or redness;   minimal OA changes - no FMTP anywhere

## 2024-10-04 NOTE — PROCEDURE
[Today's Date:] : Date: [unfilled] [Risks] : risks [Benefits] : benefits [Alternatives] : alternatives [Consent Obtained] : written consent was obtained prior to the procedure and is detailed in the patient's record [Patient] : Prior to the start of the procedure a time out was taken and the identity of the patient was confirmed via name and date of birth with the patient. The correct site and the procedure to be performed were confirmed. The correct side was confirmed if applicable. The availability of the correct equipment was verified [Therapeutic] : therapeutic [#1 Site: ______] : #1 site identified in the [unfilled] [Alcohol] : alcohol [25 gauge 5/8  inch] : A 25 gauge 5/8 inch needle was used [Tolerated Well] : the patient tolerated the procedure well [No Complications] : there were no complications [Instructions Given] : handouts/patient instructions were given to patient [Patient Instructed to Call] : patient was instructed to call if redness at site, a decrease in range of motion or an increase in pain is noted after procedure. [de-identified] : Fluzone [FreeTextEntry1] : Monitor for flu-like symptoms: fevers, chills, aches, etc. If any concerns, call us.

## 2024-10-04 NOTE — DATA REVIEWED
[FreeTextEntry1] : Labs   stable overall, EV neg, C3/4 still slightly elevated, nl CBC, CMP. CK,  A1c 5.7 (from 6.1), ESr stable at 34-few ys ago > 67../ CRP 5. Neg dsDNA and APS studies not done   + dsDNA 59 w/ neg EV, SEBASTIAN, SSA/ B, + LAC w/ ACL 1gM 19,   B2G IGA/ IGG nl, phosphatidyly neg, ACL neg, LAC neg, EV, TPO/ TG, dsDNA neg, nl C3/4 vit D 25, TSH/ ESR, CRP, PTT   B2G IgM > 150, ACL 1gM > 15 w/ nl LAC Positive and all other serologies neg w/ nl C3/4 and neg TPO/TG  3/16:  Uric acid 4.8, CMP, CBC nl   Imagin/18 US Thyroid/ neck neg for cervical / supraclavicular abnormality.  Xray SI joints  nl  Dexa 16 excellent all + values

## 2024-10-11 ENCOUNTER — APPOINTMENT (OUTPATIENT)
Dept: DERMATOLOGY | Facility: CLINIC | Age: 59
End: 2024-10-11
Payer: MEDICAID

## 2024-10-11 DIAGNOSIS — L57.0 ACTINIC KERATOSIS: ICD-10-CM

## 2024-10-11 DIAGNOSIS — L81.4 OTHER MELANIN HYPERPIGMENTATION: ICD-10-CM

## 2024-10-11 DIAGNOSIS — L82.1 OTHER SEBORRHEIC KERATOSIS: ICD-10-CM

## 2024-10-11 PROCEDURE — 99213 OFFICE O/P EST LOW 20 MIN: CPT | Mod: 25

## 2024-10-11 PROCEDURE — 17000 DESTRUCT PREMALG LESION: CPT

## 2024-11-01 ENCOUNTER — APPOINTMENT (OUTPATIENT)
Dept: GASTROENTEROLOGY | Facility: CLINIC | Age: 59
End: 2024-11-01
Payer: MEDICAID

## 2024-11-01 VITALS
BODY MASS INDEX: 39.11 KG/M2 | SYSTOLIC BLOOD PRESSURE: 140 MMHG | HEIGHT: 59 IN | WEIGHT: 194 LBS | DIASTOLIC BLOOD PRESSURE: 80 MMHG

## 2024-11-01 DIAGNOSIS — Z12.11 ENCOUNTER FOR SCREENING FOR MALIGNANT NEOPLASM OF COLON: ICD-10-CM

## 2024-11-01 DIAGNOSIS — R11.10 VOMITING, UNSPECIFIED: ICD-10-CM

## 2024-11-01 PROCEDURE — 99204 OFFICE O/P NEW MOD 45 MIN: CPT

## 2024-11-01 RX ORDER — SODIUM SULFATE, POTASSIUM SULFATE AND MAGNESIUM SULFATE 1.6; 3.13; 17.5 G/177ML; G/177ML; G/177ML
17.5-3.13-1.6 SOLUTION ORAL
Qty: 2 | Refills: 0 | Status: ACTIVE | COMMUNITY
Start: 2024-11-01 | End: 1900-01-01

## 2024-11-01 RX ORDER — OMEPRAZOLE 40 MG/1
40 CAPSULE, DELAYED RELEASE ORAL
Qty: 90 | Refills: 0 | Status: ACTIVE | COMMUNITY
Start: 2024-11-01 | End: 1900-01-01

## 2024-11-01 RX ORDER — OMEPRAZOLE 40 MG/1
40 CAPSULE, DELAYED RELEASE ORAL
Qty: 90 | Refills: 1 | Status: ACTIVE | COMMUNITY
Start: 2024-11-01 | End: 1900-01-01

## 2024-12-18 ENCOUNTER — APPOINTMENT (OUTPATIENT)
Dept: DERMATOLOGY | Facility: CLINIC | Age: 59
End: 2024-12-18
Payer: SELF-PAY

## 2024-12-18 DIAGNOSIS — Z41.1 ENCOUNTER FOR COSMETIC SURGERY: ICD-10-CM

## 2024-12-18 PROCEDURE — D0051: CPT

## 2025-02-03 ENCOUNTER — RESULT REVIEW (OUTPATIENT)
Age: 60
End: 2025-02-03

## 2025-02-03 ENCOUNTER — APPOINTMENT (OUTPATIENT)
Dept: GASTROENTEROLOGY | Facility: AMBULATORY MEDICAL SERVICES | Age: 60
End: 2025-02-03
Payer: MEDICAID

## 2025-02-03 PROCEDURE — 43239 EGD BIOPSY SINGLE/MULTIPLE: CPT

## 2025-02-05 DIAGNOSIS — K20.90 ESOPHAGITIS, UNSPECIFIED WITHOUT BLEEDING: ICD-10-CM

## 2025-02-05 DIAGNOSIS — Z12.11 ENCOUNTER FOR SCREENING FOR MALIGNANT NEOPLASM OF COLON: ICD-10-CM

## 2025-02-05 RX ORDER — SODIUM SULFATE, POTASSIUM SULFATE AND MAGNESIUM SULFATE 1.6; 3.13; 17.5 G/177ML; G/177ML; G/177ML
17.5-3.13-1.6 SOLUTION ORAL
Qty: 2 | Refills: 0 | Status: ACTIVE | COMMUNITY
Start: 2025-02-05 | End: 1900-01-01

## 2025-02-05 RX ORDER — OMEPRAZOLE 40 MG/1
40 CAPSULE, DELAYED RELEASE ORAL
Qty: 30 | Refills: 2 | Status: ACTIVE | COMMUNITY
Start: 2025-02-05 | End: 1900-01-01

## 2025-02-27 ENCOUNTER — RESULT REVIEW (OUTPATIENT)
Age: 60
End: 2025-02-27

## 2025-02-27 ENCOUNTER — APPOINTMENT (OUTPATIENT)
Dept: GASTROENTEROLOGY | Facility: AMBULATORY MEDICAL SERVICES | Age: 60
End: 2025-02-27
Payer: MEDICAID

## 2025-02-27 PROCEDURE — 45385 COLONOSCOPY W/LESION REMOVAL: CPT
